# Patient Record
Sex: FEMALE | Race: WHITE | URBAN - METROPOLITAN AREA
[De-identification: names, ages, dates, MRNs, and addresses within clinical notes are randomized per-mention and may not be internally consistent; named-entity substitution may affect disease eponyms.]

---

## 2017-10-27 ENCOUNTER — TRANSFERRED RECORDS (OUTPATIENT)
Dept: HEALTH INFORMATION MANAGEMENT | Facility: CLINIC | Age: 67
End: 2017-10-27

## 2017-12-08 ENCOUNTER — TRANSFERRED RECORDS (OUTPATIENT)
Dept: HEALTH INFORMATION MANAGEMENT | Facility: CLINIC | Age: 67
End: 2017-12-08

## 2018-01-05 ENCOUNTER — TRANSFERRED RECORDS (OUTPATIENT)
Dept: HEALTH INFORMATION MANAGEMENT | Facility: CLINIC | Age: 68
End: 2018-01-05

## 2018-01-12 LAB — COPATH REPORT: NORMAL

## 2018-01-12 PROCEDURE — 00000346 ZZHCL STATISTIC REVIEW OUTSIDE SLIDES TC 88321: Performed by: OBSTETRICS & GYNECOLOGY

## 2018-01-29 ENCOUNTER — ONCOLOGY VISIT (OUTPATIENT)
Dept: ONCOLOGY | Facility: CLINIC | Age: 68
End: 2018-01-29
Attending: OBSTETRICS & GYNECOLOGY
Payer: COMMERCIAL

## 2018-01-29 VITALS
HEART RATE: 59 BPM | HEIGHT: 61 IN | OXYGEN SATURATION: 97 % | DIASTOLIC BLOOD PRESSURE: 83 MMHG | SYSTOLIC BLOOD PRESSURE: 173 MMHG | TEMPERATURE: 97.7 F | BODY MASS INDEX: 36.65 KG/M2 | WEIGHT: 194.1 LBS | RESPIRATION RATE: 18 BRPM

## 2018-01-29 DIAGNOSIS — D06.9 CARCINOMA IN SITU OF CERVIX, UNSPECIFIED LOCATION: Primary | ICD-10-CM

## 2018-01-29 PROCEDURE — G0463 HOSPITAL OUTPT CLINIC VISIT: HCPCS | Mod: ZF

## 2018-01-29 PROCEDURE — 99205 OFFICE O/P NEW HI 60 MIN: CPT | Mod: ZP | Performed by: OBSTETRICS & GYNECOLOGY

## 2018-01-29 RX ORDER — HYDROCHLOROTHIAZIDE 25 MG/1
TABLET ORAL
COMMUNITY
Start: 2003-12-24 | End: 2018-03-19

## 2018-01-29 RX ORDER — ATENOLOL 25 MG/1
TABLET ORAL
COMMUNITY
Start: 2003-12-24 | End: 2018-03-19

## 2018-01-29 RX ORDER — AMLODIPINE BESYLATE 5 MG/1
TABLET ORAL
COMMUNITY
Start: 2003-12-24 | End: 2018-03-19

## 2018-01-29 RX ORDER — BUTALBITAL, ASPIRIN, AND CAFFEINE 325; 50; 40 MG/1; MG/1; MG/1
CAPSULE ORAL
COMMUNITY
Start: 2004-10-07 | End: 2018-03-19

## 2018-01-29 RX ORDER — FUROSEMIDE 20 MG
TABLET ORAL
COMMUNITY
Start: 2002-12-16 | End: 2018-03-19

## 2018-01-29 ASSESSMENT — PAIN SCALES - GENERAL: PAINLEVEL: NO PAIN (0)

## 2018-01-29 NOTE — PROGRESS NOTES
Consult Notes on Referred Patient    Date: 2018       Dr. Raza Gamino MD  [facility]  [address]  [place] [address]       RE: Jennie Pritchett  : 1950  CAROL: 2018    Dear Dr. Raza Gamino:    I had the pleasure of seeing your patient Jennie Pritchett here at the Gynecologic Cancer Clinic at the [facility] on 2018.  As you know she is a very pleasant 67 year old woman with a recent diagnosis of RUDOLPH 2-3.  Given these findings she was subsequently sent to the Gynecologic Cancer Clinic for new patient consultation.   Patient is a G8, P7 with 5 vaginal deliveries and last 2 were C-sections, went through menopause at age 54, never been on hormone replacement therapy.  She had several abnormal Pap smears with a subsequent conization ECC back in  which was benign.  Those slides have been reviewed here.  She had then again abnormal Pap smear and underwent a cervical biopsy which came back RUDOLPH 2-3.  This was back in December.  Those slides have not been reviewed here yet.  She is otherwise eating and drinking well, no nausea, vomiting, fever or chills, no vaginal bleeding, no change in urinary or bowel symptoms.  No B symptoms.  She also has a complicated surgical history.  She has had a bowel perforation which was believed to be a small-bowel perforation in 2014 and ex lap small-bowel resection.  This was followed by 2 exploratory laparotomies for hernia repairs with mesh.             Past Medical History:  Hypertension.           Past Surgical History:  1.  Two  sections.   2.  Exploratory laparotomy with small-bowel resection after small-bowel perforation.   3.  Two exploratory laparotomies with hernia repair with mesh and peritoneal repair.           Health Maintenance:  Health Maintenance Due   Topic Date Due     TETANUS IMMUNIZATION (SYSTEM ASSIGNED)  1967     HEPATITIS C SCREENING  1967     LIPID SCREEN Q5 YR  "FEMALE (SYSTEM ASSIGNED)  02/05/1994     MAMMO SCREEN Q2 YR (SYSTEM ASSIGNED)  02/05/1999     COLON CANCER SCREEN (SYSTEM ASSIGNED)  02/05/1999     ADVANCE DIRECTIVE PLANNING Q5 YRS  02/05/2004     FALL RISK ASSESSMENT  02/05/2014     DEXA SCAN SCREENING (SYSTEM ASSIGNED)  02/05/2014     PNEUMOCOCCAL (1 of 2 - PCV13) 02/05/2014     INFLUENZA VACCINE (SYSTEM ASSIGNED)  09/01/2017     Abnormal Pap smear with dysplasia.   Next colonoscopy in 2 years last one was 3 years ago.             Current Medications:     has a current medication list which includes the following prescription(s): hydrochlorothiazide, furosemide, ranitidine, amlodipine, atenolol, and butalbital-aspirin-caffeine.       Allergies:     [unfilled]        Social History:     Social History   Substance Use Topics     Smoking status: Never Smoker     Smokeless tobacco: Never Used     Alcohol use No       History   Drug Use No           Family History:   Brother had lung cancer in his 50s. Mother had breast cancer in her 70s.           Physical Exam:     /83  Pulse 59  Temp 97.7  F (36.5  C) (Oral)  Resp 18  Ht 1.537 m (5' 0.5\")  Wt 88 kg (194 lb 1.6 oz)  SpO2 97%  Breastfeeding? No  BMI 37.28 kg/m2  Body mass index is 37.28 kg/(m^2).    General Appearance: healthy and alert, no distress     Musculoskeletal: extremities non tender and without edema    Skin: no lesions or rashes     Neurological: normal gait, no gross defects     Psychiatric: appropriate mood and affect                               ABDOMEN:  Soft, nontender.  There is mesh palpable and well-healed midline incision.     PELVIC:  Normal external genitalia, normal-appearing vaginal mucosa, cervix flush vagina with scar tissue.  No adnexal masses or tenderness.  Rectovaginal confirms.             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     A total of 60 minutes was spent with the patient, 40 minutes of which were spent in counseling " the patient and/or treatment planning.    1.  Long-standing history of cervical dysplasia.   2.  Recent RUDOLPH 2-3.   3.  Hypertension.     4.  Complicated surgical history.    5.  Class 2 obesity.      I discussed with the patient we will await her biopsy results here.  If those are confirmed high-grade dysplasia, we will have her come back for treatment planning.  Most likely we are not going to be able to do another conization given how flush and scarred the cervix is, which would leave us with laser ablation versus hysterectomy.  Given her complicated surgical history, would like to try to avoid abdominal surgery if possible, as she might have some abdominal wall reconstruction given the mesh.  The patient agrees with the plan, is very appreciative of her care.  We will see her back after biopsy results have been obtained.             Thank you for allowing us to participate in the care of your patient.         Sincerely,    Bird Isbell MD, MS    Department of Obstetrics and Gynecology   Division of Gynecologic Oncology   [facility]  Phone: [phone]    CC  Patient Care Team:  Gin Kaye as PCP - General (Family Practice)  Raza Gamino MD as Referring Physician (OB/Gyn)  RAZA GAMINO

## 2018-01-29 NOTE — MR AVS SNAPSHOT
After Visit Summary   1/29/2018    Jennie Pritchett    MRN: [id number]           Patient Information     Date Of Birth          1950        Visit Information        Provider Department      1/29/2018 12:00 PM Bird Isbell MD [facility]         Follow-ups after your visit        Your next 10 appointments already scheduled     Mar 19, 2018 11:20 AM CDT   (Arrive by 11:05 AM)   Return Visit with Bird Isbell MD   [facility] ([facility])    [address] [place] [address]  [address]  [facility] [address]   [phone]            Mar 19, 2018  1:30 PM CDT   (Arrive by 1:15 PM)   PAC EVALUATION with  Pac Desiree 9   [facility] Preoperative Assessment [place] ([facility])    [address] [place] [address]  4th Floor  [facility] [address] [phone]            Mar 19, 2018  2:30 PM CDT   (Arrive by 2:15 PM)   PAC RN ASSESSMENT with  Pac Rn   [facility] Preoperative [facility] [place] ([facility])    [address] [place] [address]  4th Floor  [facility] [address] [phone]            Mar 19, 2018  3:00 PM CDT   (Arrive by 2:45 PM)   PAC Anesthesia Consult with  Pac Anesthesiologist   [facility] Preoperative Assessment [place] ([facility])    [address] [place] [address]  4th Floor  [facility] [address] [phone]            Mar 19, 2018  3:15 PM CDT   [facility] Draw with  [facility] DRAW   [facility] ([facility])    [address] [place] [address]  [address]  [facility] [id number] [phone]              Who to contact     If you have questions or need follow up information about today's clinic visit or your schedule please contact [facility] CANCER [facility] directly at [phone].  Normal or non-critical lab and imaging results will be communicated to you by MyChart, letter or phone within 4 business days after the  "clinic has received the results. If you do not hear from us within 7 days, please contact the clinic through [employer] or phone. If you have a critical or abnormal lab result, we will notify you by phone as soon as possible.  Submit refill requests through [employer] or call your pharmacy and they will forward the refill request to us. Please allow 3 business days for your refill to be completed.          Additional Information About Your Visit        [employer] Information     [employer] lets you send messages to your doctor, view your test results, renew your prescriptions, schedule appointments and more. To sign up, go to [email or web address] . Click on \"Log in\" on the left side of the screen, which will take you to the Welcome page. Then click on \"Sign up Now\" on the right side of the page.     You will be asked to enter the access code listed below, as well as some personal information. Please follow the directions to create your username and password.     Your access code is: [email or web address]  Expires: 2018  9:39 AM     Your access code will  in 90 days. If you need help or a new code, please call your [place] clinic or [phone].        Care EveryWhere ID     This is your Care EveryWhere ID. This could be used by other organizations to access your [place] medical records  [id number]        Your Vitals Were     Pulse Temperature Respirations Height Pulse Oximetry Breastfeeding?    59 97.7  F (36.5  C) (Oral) 18 1.537 m (5' 0.5\") 97% No    BMI (Body Mass Index)                   37.28 kg/m2            Blood Pressure from Last 3 Encounters:   18 173/83    Weight from Last 3 Encounters:   18 88 kg (194 lb 1.6 oz)              Today, you had the following     No orders found for display       Primary Care Provider Office Phone # Fax #    Gin Vargas [phone]       [facility] [address]  [facility] [address]        Equal Access to Services     LISET CATALAN AH: Hadii aad " bryant Red, wasamuelda lufuadadaha, qatoñota kamohsen hunter, david derekin hayaatata turnerarnoldo ty lamarlenytata stormy. So [facility] [phone].    ATENCIÓN: Si habla español, tiene a wagoner disposición servicios gratuitos de asistencia lingüística. Ashley al [phone].    We comply with applicable federal civil rights laws and [place] laws. We do not discriminate on the basis of race, color, national origin, age, disability, sex, sexual orientation, or gender identity.            Thank you!     Thank you for choosing [facility] CANCER [facility]  for your care. Our goal is always to provide you with excellent care. Hearing back from our patients is one way we can continue to improve our services. Please take a few minutes to complete the written survey that you may receive in the mail after your visit with us. Thank you!             Your Updated Medication List - Protect others around you: Learn how to safely use, store and throw away your medicines at www.disposemymeds.org.          This list is accurate as of 1/29/18 11:59 PM.  Always use your most recent med list.                   Brand Name Dispense Instructions for use Diagnosis    atenolol 25 MG tablet    TENORMIN     ONE TABLET DAILY        FIORINAL capsule   Generic drug:  butalbital-aspirin-caffeine      1-2 tabs every 6 hrs as needed for HAs        hydrochlorothiazide 25 MG tablet    HYDRODIURIL     ONE TABLET DAILY        LASIX 20 MG tablet   Generic drug:  furosemide      As needed        NORVASC 5 MG tablet   Generic drug:  amLODIPine      ONE DAILY        ranitidine 150 MG capsule    ZANTAC     one capsule two times a day as needed

## 2018-01-29 NOTE — LETTER
2018       RE: Jennie Pritchett  [address]  [facility] [address]     Dear Colleague,    Thank you for referring your patient, Jennie Pritchett, to the [facility] CANCER CLINIC. Please see a copy of my visit note below.                            Consult Notes on Referred Patient    Date: 2018       Dr. Raza Gamino MD  [facility]  [address]  [place] [address]       RE: Jennie Pritchett  : 1950  CAROL: 2018    Dear Dr. Raza Gamino:    I had the pleasure of seeing your patient Jennie Pritchett here at the Gynecologic Cancer Clinic at the [facility] on 2018.  As you know she is a very pleasant 67 year old woman with a recent diagnosis of RUDOLPH 2-3.  Given these findings she was subsequently sent to the Gynecologic Cancer Clinic for new patient consultation.   Patient is a G8, P7 with 5 vaginal deliveries and last 2 were C-sections, went through menopause at age 54, never been on hormone replacement therapy.  She had several abnormal Pap smears with a subsequent conization ECC back in  which was benign.  Those slides have been reviewed here.  She had then again abnormal Pap smear and underwent a cervical biopsy which came back RUDOLPH 2-3.  This was back in December.  Those slides have not been reviewed here yet.  She is otherwise eating and drinking well, no nausea, vomiting, fever or chills, no vaginal bleeding, no change in urinary or bowel symptoms.  No B symptoms.  She also has a complicated surgical history.  She has had a bowel perforation which was believed to be a small-bowel perforation in 2014 and ex lap small-bowel resection.  This was followed by 2 exploratory laparotomies for hernia repairs with mesh.             Past Medical History:  Hypertension.           Past Surgical History:  1.  Two  sections.   2.  Exploratory laparotomy with small-bowel resection after small-bowel perforation.   3.  Two exploratory laparotomies with  "hernia repair with mesh and peritoneal repair.           Health Maintenance:  Health Maintenance Due   Topic Date Due     TETANUS IMMUNIZATION (SYSTEM ASSIGNED)  02/05/1967     HEPATITIS C SCREENING  02/05/1967     LIPID SCREEN Q5 YR FEMALE (SYSTEM ASSIGNED)  02/05/1994     MAMMO SCREEN Q2 YR (SYSTEM ASSIGNED)  02/05/1999     COLON CANCER SCREEN (SYSTEM ASSIGNED)  02/05/1999     ADVANCE DIRECTIVE PLANNING Q5 YRS  02/05/2004     FALL RISK ASSESSMENT  02/05/2014     DEXA SCAN SCREENING (SYSTEM ASSIGNED)  02/05/2014     PNEUMOCOCCAL (1 of 2 - PCV13) 02/05/2014     INFLUENZA VACCINE (SYSTEM ASSIGNED)  09/01/2017     Abnormal Pap smear with dysplasia.   Next colonoscopy in 2 years last one was 3 years ago.             Current Medications:     has a current medication list which includes the following prescription(s): hydrochlorothiazide, furosemide, ranitidine, amlodipine, atenolol, and butalbital-aspirin-caffeine.       Allergies:     [unfilled]        Social History:     Social History   Substance Use Topics     Smoking status: Never Smoker     Smokeless tobacco: Never Used     Alcohol use No       History   Drug Use No           Family History:   Brother had lung cancer in his 50s. Mother had breast cancer in her 70s.           Physical Exam:     /83  Pulse 59  Temp 97.7  F (36.5  C) (Oral)  Resp 18  Ht 1.537 m (5' 0.5\")  Wt 88 kg (194 lb 1.6 oz)  SpO2 97%  Breastfeeding? No  BMI 37.28 kg/m2  Body mass index is 37.28 kg/(m^2).    General Appearance: healthy and alert, no distress     Musculoskeletal: extremities non tender and without edema    Skin: no lesions or rashes     Neurological: normal gait, no gross defects     Psychiatric: appropriate mood and affect                               ABDOMEN:  Soft, nontender.  There is mesh palpable and well-healed midline incision.     PELVIC:  Normal external genitalia, normal-appearing vaginal mucosa, cervix flush vagina with scar tissue.  No adnexal masses " or tenderness.  Rectovaginal confirms.             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     A total of 60 minutes was spent with the patient, 40 minutes of which were spent in counseling the patient and/or treatment planning.    1.  Long-standing history of cervical dysplasia.   2.  Recent RUDOLPH 2-3.   3.  Hypertension.     4.  Complicated surgical history.    5.  Class 2 obesity.      I discussed with the patient we will await her biopsy results here.  If those are confirmed high-grade dysplasia, we will have her come back for treatment planning.  Most likely we are not going to be able to do another conization given how flush and scarred the cervix is, which would leave us with laser ablation versus hysterectomy.  Given her complicated surgical history, would like to try to avoid abdominal surgery if possible, as she might have some abdominal wall reconstruction given the mesh.  The patient agrees with the plan, is very appreciative of her care.  We will see her back after biopsy results have been obtained.             Thank you for allowing us to participate in the care of your patient.         Sincerely,    Bird Isbell MD, MS    Department of Obstetrics and Gynecology   Division of Gynecologic Oncology   [facility]  Phone: [phone]    CC  Patient Care Team:  Gin Kaye as PCP - General (Family Practice)  Raza Gamino MD as Referring Physician (OB/Gyn)  \

## 2018-01-29 NOTE — NURSING NOTE
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 No  BMI 37.28 kg/m2 Estimated body mass index is 37.28 kg/(m^2) as calculated from the following:    Height as of this encounter: 1.537 m (5' 0.5\").    Weight as of this encounter: 88 kg (194 lb 1.6 oz). Body surface area is 1.94 meters squared.  No Pain (0) Comment: Data Unavailable   No LMP recorded. Patient is postmenopausal.  Allergies reviewed: Yes  Medications reviewed: Yes    Medications: Medication refills not needed today.  Pharmacy name entered into EPIC: [facility] [place], [place] - [address] [place] [address]    Clinical concerns: no concerns - dr. oconnor was notified      8 minutes for nursing intake (face to face time)     Pedro Luis Montoya CMA              "

## 2018-01-31 PROCEDURE — 00000346 ZZHCL STATISTIC REVIEW OUTSIDE SLIDES TC 88321: Performed by: OBSTETRICS & GYNECOLOGY

## 2018-02-01 LAB — COPATH REPORT: NORMAL

## 2018-02-08 ENCOUNTER — TELEPHONE (OUTPATIENT)
Dept: ONCOLOGY | Facility: CLINIC | Age: 68
End: 2018-02-08

## 2018-02-08 DIAGNOSIS — D06.9 CIN III (CERVICAL INTRAEPITHELIAL NEOPLASIA GRADE III) WITH SEVERE DYSPLASIA: Primary | ICD-10-CM

## 2018-02-08 NOTE — PROGRESS NOTES
Result reviewed by MD. Patient will return to see Dr. Isbell for surgery discussion. PAC will be scheduled after. Patient called with updated plan.

## 2018-02-08 NOTE — TELEPHONE ENCOUNTER
"RN called patient with result and plan. Patient would prefer to have a hysterectomy versus minimal management.     RN was unable to get return with Sukhi and PAC prior to March 19th. Patient wanted all appointments together. She stated her kids have to take off work to bring her to appointments. She prefer pushing her appointment out to \"clump\" appointments.     RN made appointments.     Margi Parkinson RN        "

## 2018-03-19 ENCOUNTER — ONCOLOGY VISIT (OUTPATIENT)
Dept: ONCOLOGY | Facility: CLINIC | Age: 68
End: 2018-03-19
Attending: OBSTETRICS & GYNECOLOGY
Payer: COMMERCIAL

## 2018-03-19 ENCOUNTER — APPOINTMENT (OUTPATIENT)
Dept: SURGERY | Facility: CLINIC | Age: 68
End: 2018-03-19
Payer: COMMERCIAL

## 2018-03-19 ENCOUNTER — ANESTHESIA EVENT (OUTPATIENT)
Dept: SURGERY | Facility: CLINIC | Age: 68
End: 2018-03-19

## 2018-03-19 ENCOUNTER — ALLIED HEALTH/NURSE VISIT (OUTPATIENT)
Dept: SURGERY | Facility: CLINIC | Age: 68
End: 2018-03-19
Payer: COMMERCIAL

## 2018-03-19 ENCOUNTER — OFFICE VISIT (OUTPATIENT)
Dept: SURGERY | Facility: CLINIC | Age: 68
End: 2018-03-19
Payer: COMMERCIAL

## 2018-03-19 VITALS
RESPIRATION RATE: 18 BRPM | SYSTOLIC BLOOD PRESSURE: 157 MMHG | TEMPERATURE: 98.2 F | HEART RATE: 69 BPM | OXYGEN SATURATION: 98 % | WEIGHT: 192.68 LBS | DIASTOLIC BLOOD PRESSURE: 80 MMHG | BODY MASS INDEX: 37.01 KG/M2

## 2018-03-19 VITALS
SYSTOLIC BLOOD PRESSURE: 157 MMHG | RESPIRATION RATE: 18 BRPM | DIASTOLIC BLOOD PRESSURE: 80 MMHG | WEIGHT: 192.6 LBS | HEART RATE: 69 BPM | HEIGHT: 61 IN | OXYGEN SATURATION: 98 % | TEMPERATURE: 98.2 F | BODY MASS INDEX: 36.36 KG/M2

## 2018-03-19 DIAGNOSIS — Z01.818 PREOP EXAMINATION: Primary | ICD-10-CM

## 2018-03-19 DIAGNOSIS — C53.9: ICD-10-CM

## 2018-03-19 DIAGNOSIS — N87.9 CERVICAL DYSPLASIA: Primary | ICD-10-CM

## 2018-03-19 LAB
ANION GAP SERPL CALCULATED.3IONS-SCNC: 12 MMOL/L (ref 3–14)
BUN SERPL-MCNC: 10 MG/DL (ref 7–30)
CALCIUM SERPL-MCNC: 9.7 MG/DL (ref 8.5–10.1)
CHLORIDE SERPL-SCNC: 98 MMOL/L (ref 94–109)
CO2 SERPL-SCNC: 22 MMOL/L (ref 20–32)
CREAT SERPL-MCNC: 0.93 MG/DL (ref 0.52–1.04)
ERYTHROCYTE [DISTWIDTH] IN BLOOD BY AUTOMATED COUNT: 13.1 % (ref 10–15)
GFR SERPL CREATININE-BSD FRML MDRD: 60 ML/MIN/1.7M2
GLUCOSE SERPL-MCNC: 97 MG/DL (ref 70–99)
HCT VFR BLD AUTO: 36 % (ref 35–47)
HGB BLD-MCNC: 12.2 G/DL (ref 11.7–15.7)
MCH RBC QN AUTO: 30.4 PG (ref 26.5–33)
MCHC RBC AUTO-ENTMCNC: 33.9 G/DL (ref 31.5–36.5)
MCV RBC AUTO: 90 FL (ref 78–100)
PLATELET # BLD AUTO: 303 10E9/L (ref 150–450)
POTASSIUM SERPL-SCNC: 4.1 MMOL/L (ref 3.4–5.3)
RBC # BLD AUTO: 4.01 10E12/L (ref 3.8–5.2)
SODIUM SERPL-SCNC: 131 MMOL/L (ref 133–144)
WBC # BLD AUTO: 6.4 10E9/L (ref 4–11)

## 2018-03-19 PROCEDURE — 86901 BLOOD TYPING SEROLOGIC RH(D): CPT | Performed by: PHYSICIAN ASSISTANT

## 2018-03-19 PROCEDURE — 86900 BLOOD TYPING SEROLOGIC ABO: CPT | Performed by: PHYSICIAN ASSISTANT

## 2018-03-19 PROCEDURE — 86850 RBC ANTIBODY SCREEN: CPT | Performed by: PHYSICIAN ASSISTANT

## 2018-03-19 PROCEDURE — 85027 COMPLETE CBC AUTOMATED: CPT | Performed by: PHYSICIAN ASSISTANT

## 2018-03-19 PROCEDURE — 36415 COLL VENOUS BLD VENIPUNCTURE: CPT

## 2018-03-19 PROCEDURE — 99214 OFFICE O/P EST MOD 30 MIN: CPT | Mod: 57 | Performed by: OBSTETRICS & GYNECOLOGY

## 2018-03-19 PROCEDURE — G0463 HOSPITAL OUTPT CLINIC VISIT: HCPCS | Mod: ZF

## 2018-03-19 PROCEDURE — 80048 BASIC METABOLIC PNL TOTAL CA: CPT | Performed by: PHYSICIAN ASSISTANT

## 2018-03-19 RX ORDER — MULTIPLE VITAMINS W/ MINERALS TAB 9MG-400MCG
1 TAB ORAL AT BEDTIME
COMMUNITY

## 2018-03-19 ASSESSMENT — PAIN SCALES - GENERAL: PAINLEVEL: NO PAIN (0)

## 2018-03-19 NOTE — NURSING NOTE
Chief Complaint   Patient presents with     Blood Draw     Labs drawn from left hand in lab by CMA      Pt tolerated well

## 2018-03-19 NOTE — PATIENT INSTRUCTIONS
Preparing for Your Surgery      Name:  Jennie Pritchett   MRN:  [id number]   :  1950   Today's Date:  3/19/2018     Arriving for surgery:  Jennie, We will call you with your surgery date, time, location and fasting instructions  Surgery date:    Arrival time:    Please come to:         What can I eat or drink?  -  You may have solid food or milk products until 8 hours prior to your surgery.  -  You may have water, apple juice or 7up/Sprite until 2 hours prior to your surgery.    Which medicines can I take? (Please hold aspirin products 7 days prior to procedure)  -  Do NOT take these medications in the morning, the day of surgery:      -  Please take these medications the day of surgery:         How do I prepare myself?  -  Take two showers: one the night before surgery; and one the morning of surgery.         Use Scrubcare or Hibiclens to wash from neck down.  You may use your own shampoo and conditioner. No other hair products.   -  Do NOT use lotion, powder, deodorant, or antiperspirant the day of your surgery.  -  Do NOT wear any makeup, fingernail polish or jewelry.  -Do not bring your own medications to the hospital, except for inhalers and eye drops.  -  Bring your ID and insurance card.    Questions or Concerns:  If you have questions or concerns regarding the day of surgery, please call the Preoperative Assessment Center (PAC), Monday-Friday 7AM-7PM:  [phone].  After surgery please call your surgeons office.           AFTER YOUR SURGERY  Breathing exercises   Breathing exercises help you recover faster. Take deep breaths and let the air out slowly. This will:     Help you wake up after surgery.    Help prevent complications like pneumonia.  Preventing complications will help you go home sooner.   We may give you a breathing device (incentive spirometer) to encourage you to breathe deeply.   Nausea and vomiting   You may feel sick to your stomach after surgery; if so, let your nurse know.    Pain  control:  After surgery, you may have pain. Our goal is to help you manage your pain. Pain medicine will help you feel comfortable enough to do activities that will help you heal.  These activities may include breathing exercises, walking and physical therapy.   To help your health care team treat your pain we will ask: 1) If you have pain  2) where it is located 3) describe your pain in your words  Methods of pain control include medications given by mouth, vein or by nerve block for some surgeries.  We may give you a pain control pump that will:  1) Deliver the medicine through a tube placed in your vein  2) Control the amount of medicine you receive  3) Allow you to push a button to deliver a dose of pain medicine  Sequential Compression Device (SCD) or Pneumo Boots:  You may need to wear SCD S on your legs or feet. These are wraps connected to a machine that pumps in air and releases it. The repeated pumping helps prevent blood clots from forming.

## 2018-03-19 NOTE — LETTER
3/19/2018       RE: Jennie Pritchett  [address]  [facility] [address]     Dear Colleague,    Thank you for referring your patient, Jennie Pritchett, to the [facility]. Please see a copy of my visit note below.                Follow Up Notes on Referred Patient    Date: 3/19/2018       Dr. Gin Kaye  [facility]  [address] [place] [address]       RE: Jennie Pritchett  : 1950  CAROL: 3/19/2018    Dear Dr. Gin Kaye:    Jennie Pritchett is a 68 year old woman with a diagnosis of high grade cervical dysplasia.             The patient presents today for followup.  She has been doing well since the last time I have seen her.  No new symptoms.         Past Medical History:    Past Medical History:   Diagnosis Date     GERD (gastroesophageal reflux disease)      History of small bowel obstruction     s/p resection     Hyperlipidemia      Hypertension      Obesity          Past Surgical History:    Past Surgical History:   Procedure Laterality Date     BUNIONECTOMY       CATARACT IOL, RT/LT        SECTION       COLONOSCOPY       CONIZATION  2015     HERNIA REPAIR  2014    ventral hernia     SMALL BOWEL RESECTION           Health Maintenance Due   Topic Date Due     TETANUS IMMUNIZATION (SYSTEM ASSIGNED)  1967     HEPATITIS C SCREENING  1967     LIPID SCREEN Q5 YR FEMALE (SYSTEM ASSIGNED)  1994     MAMMO SCREEN Q2 YR (SYSTEM ASSIGNED)  1999     COLON CANCER SCREEN (SYSTEM ASSIGNED)  1999     ADVANCE DIRECTIVE PLANNING Q5 YRS  2004     FALL RISK ASSESSMENT  2014     DEXA SCAN SCREENING (SYSTEM ASSIGNED)  2014     PNEUMOCOCCAL (1 of 2 - PCV13) 2014       Current Medications:     Current Outpatient Prescriptions   Medication Sig Dispense Refill     LOSARTAN POTASSIUM PO Take by mouth every morning       HYDRALAZINE HCL PO Take by mouth 2 times daily       Calcium Citrate-Vitamin D (CALCIUM + D PO) Take by  mouth 2 times daily       MAGNESIUM PO Take by mouth 2 times daily       Solifenacin Succinate (VESICARE PO) Take by mouth At Bedtime       CARVEDILOL PO Take by mouth 2 times daily (with meals)       multivitamin, therapeutic with minerals (MULTI-VITAMIN) TABS tablet Take 1 tablet by mouth At Bedtime       Docusate Sodium (COLACE PO) Take by mouth At Bedtime       ranitidine (ZANTAC) 150 MG capsule one capsule two times a day as needed           Allergies:        Allergies   Allergen Reactions     Lisinopril Cough     Metoprolol Other (See Comments)     Causes lowered heart rate         Social History:     Social History   Substance Use Topics     Smoking status: Former Smoker     Packs/day: 0.25     Years: 40.00     Quit date: 3/19/2000     Smokeless tobacco: Never Used     Alcohol use Yes      Comment: monthy       History   Drug Use No           Physical Exam:     /80  Pulse 69  Temp 98.2  F (36.8  C) (Oral)  Resp 18  Wt 87.4 kg (192 lb 10.9 oz)  SpO2 98%  BMI 37.01 kg/m2  Body mass index is 37.01 kg/(m^2).    General Appearance: healthy and alert, no distress     ABDOMEN:  Soft, nontender, nondistended.  Well-healed midline incision.   PELVIC:  Normal external genitalia.  The cervix has been removed with a cone biopsy.  No abnormalities on bimanual exam.  Confirmed rectovaginal.         Assessment:    Jennie Pritchett is a 68 year old woman with a diagnosis of high grade cervical dysplasia.     A total of 30 minutes was spent with the patient, 25 minutes of which were spent in counseling the patient and/or treatment planning.      1.  High-grade cervical dysplasia.   2.  Prior bowel perforation with bowel resection.   3.  Abdominal wall hernia repair.       Discussed with the patient, recommended to proceed with a robotic hysterectomy as well as salpingo-oophorectomy, possible pelvic and paraaortic lymphadenectomy.  There is a significant chance we will have to do this open, as well as possible bowel  resection and even possible stoma.  We will obtain an MRI to be sure there are no structural abnormalities in the cervix, as we cannot do another conization given the absence of the cervix.  The patient agrees with the plan.  I will have her see my colleagues in Anesthesia for preoperative clearance.  All questions were answered.       Risks, benefits and alternatives to proceed discussed in detail with the patient. Risks include but are not limited to bleeding, infection, possible injury to surrounding organs including bowel, bladder, ureter, need for second procedure/surgery related to complications from first procedure, postoperative medical complications such as cardiopulmonary events, lymphedema, lymphocyst, thromboembolic events.  Consent for surgery, blood transfusion signed.  Will arrange appropriate preoperative blood work, CXR, EKG. Patient also advised on need for postoperative surveillance and/or adjuvant therapy. Questions answered.    Bird Isbell MD, MS    Department of Obstetrics and Gynecology   Division of Gynecologic Oncology   [facility]  Phone: [phone]      CC  Patient Care Team:  Gin Kaye as PCP - General (Family Practice)

## 2018-03-19 NOTE — NURSING NOTE
Pre Op Nurse Teaching Template    Relevant Diagnosis: Cervical Dysplasia     Teaching Topic: Robotic assisted hysterectomy, BSO, possible lymph node dissection, possible open, possible cancer staging, possible bowel resection, cystoscopy    Person(s) involved in teaching :  Patient  Alone   Motivation Level:  Asks Questions:    Yes      Eager to Learn:     Yes     Cooperative:          Yes    Receptive (willing. Able to accept information):    Yes      Patient and those who are listed above demonstrates understanding of the following:   Reason for the appointment, diagnosis and treatment plan:   Yes   Knowledge of proper use of medications and conditions for which they are ordered (with special attention to potential side effects or drug interactions): Yes   Which situations necessitate calling provider and whom to contact: Yes         Nutritional needs and diet plan:  Yes      Pain management techniques:     Yes, Pain Scale   Diet:   Yes, [facility] Diet Instructions    Teaching Concerns addressed: Yes    Infection Prevention:  Patient and those who are listed above demonstrate understanding of the following:  Pre-Op CHG Bathing Instructions: Yes  Surgical procedure site care taught:   Yes   Signs and symptoms of infection taught: Yes       Instructional Materials Used/Given:  The [place] Before You Surgery Booklet  Showering or Bathing before Surgery Instructions   Hysterectomy Guidelines  Pain Assessment Tool   Home Care after Major Abdominal or Vaginal Surgery  Map  Accommodations Brochure  Phone numbers for [facility] and [facility]  Copy of Surgical Consent    Comments:  CHANDNI Parkinson RN

## 2018-03-19 NOTE — H&P
Pre-Operative H & P     CC:  Preoperative exam to assess for increased cardiopulmonary risk while undergoing surgery and anesthesia.    Date of Encounter: 2018   Primary Care Physician:  Gin Kaye   Reason for Visit/Surgery:  Carcinoma of cervix (H) [C53.9]      HPI  Jennie Pritchett is a 68 year old female who presents for pre-operative H & P in preparation for a Total Hysterectomy on Date TBD with Dr. Isbell for cervical carcinoma at the [facility].     Ms. Pritchett has a long standing history of cervical dysplasia s/p cervical conization in  and colposcopy 2018.  This has progressed to carcinoma of the cervix, so the above surgery is recommended as the next step in treatment.   She reports feeling well overall.  She does have  long standing inner ear issues causing balance problems, but has not fallen.  She reports having a normal exercise stress test in  from her home clinic of [facility] in [place].  We have requested these records.  She has a 10 pack year smoking history and quit in .  She has no known pulmonary disease.  She goes up and down several steps daily and gets on the floor to clean weekly.      History is obtained from the  medical record including Care Everywhere.        Past Medical History  Past Medical History:   Diagnosis Date     GERD (gastroesophageal reflux disease)      History of small bowel obstruction     s/p resection     Hyperlipidemia      Hypertension      Obesity         Past Surgical History  Past Surgical History:   Procedure Laterality Date     BUNIONECTOMY  2004     CATARACT IOL, RT/LT        SECTION       COLONOSCOPY       CONIZATION  2015     HERNIA REPAIR      ventral hernia     SMALL BOWEL RESECTION         Hx of Blood transfusions/reactions: No reactions     Personal or FH with difficulty with Anesthesia:  None    Prior to Admission Medications  Current Outpatient Prescriptions    Medication Sig Dispense Refill     LOSARTAN POTASSIUM PO Take by mouth every morning       HYDRALAZINE HCL PO Take by mouth 2 times daily       Calcium Citrate-Vitamin D (CALCIUM + D PO) Take by mouth 2 times daily       MAGNESIUM PO Take by mouth 2 times daily       Solifenacin Succinate (VESICARE PO) Take by mouth At Bedtime       CARVEDILOL PO Take by mouth 2 times daily (with meals)       multivitamin, therapeutic with minerals (MULTI-VITAMIN) TABS tablet Take 1 tablet by mouth At Bedtime       Docusate Sodium (COLACE PO) Take by mouth At Bedtime       ranitidine (ZANTAC) 150 MG capsule one capsule two times a day as needed           Allergies  Lisinopril and Metoprolol     Social History  Social History     Social History     Marital status:      Spouse name: N/A     Number of children: N/A     Years of education: N/A     Occupational History     Not on file.     Social History Main Topics     Smoking status: Former Smoker     Packs/day: 0.25     Years: 40.00     Quit date: 3/19/2000     Smokeless tobacco: Never Used     Alcohol use Yes      Comment: monthy     Drug use: No     Sexual activity: Not on file     Other Topics Concern     Not on file     Social History Narrative          Family History  No family history of bleeding, clotting disorders or complications with anesthesia.      ROS   The complete review of systems is negative other than noted in the HPI or here.   Constitutional: Denies  fevers/chills.    EENT: Denies difficulty opening mouth or swallowing.  Cardiovascular: Denies pain, tightness or squeezing in chest, upper abdomen, shoulder, or neck.  Denies HOLLINS or orthopnea, palpitations or syncope.  Respiratory: Denies significant shortness of breath or cough.    GI:  Heartburn; denies nausea/vomiting     : Denies dysuria   Musculoskeletal: Arthritis  Skin: Denies rashes, infection or wounds.    Hematologic: Denies prolonged bleeding, anemia or blood clot history  Neurologic: Denies  "history of stroke, TIA, migraines, seizures, dizziness, numbness/tingling  Psychiatric: Denies changes in mood or affect.      Cardiology Tests: (personally reviewed):   Review Results Below in A/P    Labs: (personally reviewed):  Lab Results   Component Value Date    WBC 6.4 03/19/2018    HGB 12.2 03/19/2018    HCT 36.0 03/19/2018     03/19/2018     (L) 03/19/2018    POTASSIUM 4.1 03/19/2018    HENRY 9.7 03/19/2018    GLC 97 03/19/2018    CR 0.93 03/19/2018    BUN 10 03/19/2018    CO2 22 03/19/2018          Physical Exam:  No LMP recorded. Patient is postmenopausal.   Vital signs:  /80  Pulse 69  Temp 98.2  F (36.8  C) (Oral)  Resp 18  Ht 1.537 m (5' 0.5\")  Wt 87.4 kg (192 lb 9.6 oz)  SpO2 98%  BMI 37 kg/m2    Constitutional: Awake, alert, cooperative, no apparent distress, and appears stated age.  Eyes: Pupils equal, round and reactive to light, sclera clear, conjunctiva normal.  HENT: Normocephalic, oral pharynx with moist mucus membranes. No goiter appreciated.   Respiratory: Clear to auscultation bilaterally, no crackles or wheezing.  Cardiovascular: Regular rate and rhythm and no overt murmur noted.  No carotid bruits auscultated. No edema. Palpable pulses to radial  DP and PT arteries.   GI: Normal bowel sounds, soft, non-distended, non-tender, no masses palpated  Skin: Warm and dry.  No rashes at anticipated surgical site.   Musculoskeletal: Full extension of the neck.  No redness, warmth, or swelling of the joints noted. Gross motor strength is normal.    Neurologic: Awake, alert, oriented to name, place and time.  Gait is normal.   Neuropsychiatric: Calm, cooperative. Normal affect.     Assessment/Plan  Jennie Pritchett is a 68 year old female who presents for pre-operative H & P in preparation for a Total Hysterectomy on Date TBD with Dr. Isbell for cervical carcinoma at the [facility].    PAC referral for risk assessment and optimization " for anesthesia with comorbid conditions of:    Pre-operative considerations:  1.  Cardiac:  Functional status METS >4   Risk of Major Adverse Cardiac event: 0.9%  -HTN, no known cardiac disease, patient reports having a normal exercise stress test in 2017 from her home clinic of [facility] in [place],  records requested  2.  Pulm:   CARLTON risk:  low  -Former smoker, 10 pack years, quit 2000  3.  GI:  Risk of PONV score =3 .  If > 2, anti-emetic intervention recommended.  -History of small bowel obstruction s/p small bowel resection in 2014    Patient is optimized and is an acceptable candidate for the proposed procedure.  No further diagnostic evaluation is needed.      AVS given to patient regarding medication instructions,  surgery time/arrival time and NPO status.  Gill Flores MS PA-C   Preoperative Assessment Center  [facility]  [facility] and [facility]  Phone: [phone]  Fax: [phone]

## 2018-03-19 NOTE — MR AVS SNAPSHOT
After Visit Summary   3/19/2018    Jennie Pritchett    MRN: [id number]           Patient Information     Date Of Birth          1950        Visit Information        Provider Department      3/19/2018 2:30 PM Rn, [facility]        Care Instructions    Preparing for Your Surgery      Name:  Jennie Pritchett   MRN:  [id number]   :  1950   Today's Date:  3/19/2018     Arriving for surgery:  Jennie, We will call you with your surgery date, time, location and fasting instructions  Surgery date:    Arrival time:    Please come to:         What can I eat or drink?  -  You may have solid food or milk products until 8 hours prior to your surgery.  -  You may have water, apple juice or 7up/Sprite until 2 hours prior to your surgery.    Which medicines can I take? (Please hold aspirin products 7 days prior to procedure)  -  Do NOT take these medications in the morning, the day of surgery:      -  Please take these medications the day of surgery:         How do I prepare myself?  -  Take two showers: one the night before surgery; and one the morning of surgery.         Use Scrubcare or Hibiclens to wash from neck down.  You may use your own shampoo and conditioner. No other hair products.   -  Do NOT use lotion, powder, deodorant, or antiperspirant the day of your surgery.  -  Do NOT wear any makeup, fingernail polish or jewelry.  -Do not bring your own medications to the hospital, except for inhalers and eye drops.  -  Bring your ID and insurance card.    Questions or Concerns:  If you have questions or concerns regarding the day of surgery, please call the Preoperative Assessment Center (PAC), Monday-Friday 7AM-7PM:  [phone].  After surgery please call your surgeons office.           AFTER YOUR SURGERY  Breathing exercises   Breathing exercises help you recover faster. Take deep breaths and let the air out slowly. This will:     Help you wake up after surgery.     Help prevent complications like pneumonia.  Preventing complications will help you go home sooner.   We may give you a breathing device (incentive spirometer) to encourage you to breathe deeply.   Nausea and vomiting   You may feel sick to your stomach after surgery; if so, let your nurse know.    Pain control:  After surgery, you may have pain. Our goal is to help you manage your pain. Pain medicine will help you feel comfortable enough to do activities that will help you heal.  These activities may include breathing exercises, walking and physical therapy.   To help your health care team treat your pain we will ask: 1) If you have pain  2) where it is located 3) describe your pain in your words  Methods of pain control include medications given by mouth, vein or by nerve block for some surgeries.  We may give you a pain control pump that will:  1) Deliver the medicine through a tube placed in your vein  2) Control the amount of medicine you receive  3) Allow you to push a button to deliver a dose of pain medicine  Sequential Compression Device (SCD) or Pneumo Boots:  You may need to wear SCD S on your legs or feet. These are wraps connected to a machine that pumps in air and releases it. The repeated pumping helps prevent blood clots from forming.           Follow-ups after your visit        Your next 10 appointments already scheduled     Mar 19, 2018  3:00 PM CDT   (Arrive by 2:45 PM)   PAC Anesthesia Consult with  Pac Anesthesiologist   [facility] ([facility] [place])    [address] [place] [address]  4th Floor  [facility] [address] [phone]            Mar 19, 2018  3:15 PM CDT   [facility] Lab Draw with  MAS[facility]IC LAB DRAW   [facility] Masonic Lab Draw ([facility])    [address] [place] [address]  [address]  [facility] [address]   [phone]            Mar 27, 2018  7:00 AM CDT   (Arrive by 6:45 AM)   MR PELVIS W/O & W CONTRAST with [id number]    [facility] MRI ([facility] [place])    [address] [facility]  1st Floor  [facility] [address] [phone]           Take your medicines as usual, unless your doctor tells you not to. Bring a list of your current medicines to your exam (including vitamins, minerals and over-the-counter drugs).  You may or may not receive intravenous (IV) contrast for this exam pending the discretion of the Radiologist.  You do not need to do anything special to prepare.  The MRI machine uses a strong magnet. Please wear clothes without metal (snaps, zippers). A sweatsuit works well, or we may give you a hospital gown.  Please remove any body piercings and hair extensions before you arrive. You will also remove watches, jewelry, hairpins, wallets, dentures, partial dental plates and hearing aids. You may wear contact lenses, and you may be able to wear your rings. We have a safe place to keep your personal items, but it is safer to leave them at home.  **IMPORTANT** THE INSTRUCTIONS BELOW ARE ONLY FOR THOSE PATIENTS WHO HAVE BEEN PRESCRIBED SEDATION OR GENERAL ANESTHESIA DURING THEIR MRI PROCEDURE:  IF YOUR DOCTOR PRESCRIBED ORAL SEDATION (take medicine to help you relax during your exam):   You must get the medicine from your doctor (oral medication) before you arrive. Bring the medicine to the exam. Do not take it at home. You ll be told when to take it upon arriving for your exam.   Arrive one hour early. Bring someone who can take you home after the test. Your medicine will make you sleepy. After the exam, you may not drive, take a bus or take a taxi by yourself.  IF YOUR DOCTOR PRESCRIBED IV SEDATION:   Arrive one hour early. Bring someone who can take you home after the test. Your medicine will make you sleepy. After the exam, you may not drive, take a bus or take a taxi by yourself.   No eating 6 hours before your exam. You may have clear liquids up until 4 hours before your exam. (Clear  liquids include water, clear tea, black coffee and fruit juice without pulp.)  IF YOUR DOCTOR PRESCRIBED ANESTHESIA (be asleep for your exam):   Arrive 1 1/2 hours early. Bring someone who can take you home after the test. You may not drive, take a bus or take a taxi by yourself.   No eating 8 hours before your exam. You may have clear liquids up until 4 hours before your exam. (Clear liquids include water, clear tea, black coffee and fruit juice without pulp.)   You will spend four to five hours in the recovery room.  Please call the Imaging Department at your exam site with any questions.              Future tests that were ordered for you today     Open Future Orders        Priority Expected Expires Ordered    ABO/Rh type and screen Routine 3/19/2018 2018 3/19/2018    CBC with platelets Routine 3/19/2018 2018 3/19/2018    Basic metabolic panel Routine 3/19/2018 2018 3/19/2018    MRI Pelvis w & w/o contrast Routine  3/19/2019 3/19/2018            Who to contact     Please call your clinic at [phone] to:    Ask questions about your health    Make or cancel appointments    Discuss your medicines    Learn about your test results    Speak to your doctor            Additional Information About Your Visit        shenzhoufuhart Information     [email or web address] is an electronic gateway that provides easy, online access to your medical records. With [email or web address], you can request a clinic appointment, read your test results, renew a prescription or communicate with your care team.     To sign up for [email or web address] visit the website at [email or web address]   You will be asked to enter the access code listed below, as well as some personal information. Please follow the directions to create your username and password.     Your access code is: [email or web address]  Expires: 2018 10:39 AM     Your access code will  in 90 days. If you need help or a new code, please contact your [facility] or  call [phone] for assistance.        Care EveryWhere ID     This is your Care EveryWhere ID. This could be used by other organizations to access your [facility] medical records  [id number]         Blood Pressure from Last 3 Encounters:   03/19/18 157/80   03/19/18 157/80   01/29/18 173/83    Weight from Last 3 Encounters:   03/19/18 87.4 kg (192 lb 9.6 oz)   03/19/18 87.4 kg (192 lb 10.9 oz)   01/29/18 88 kg (194 lb 1.6 oz)              Today, you had the following     No orders found for display       Primary Care Provider Office Phone # Fax #    Gin Kaye [phone]       [facility] [address] [facility] [address]        Equal Access to Services     LISET CATALAN : Hadii kika hurtado hadasho Soomaali, waaxda luqadaha, qaybta kaalmada adeegyada, david zheng . So [facility] [phone].    ATENCIÓN: Si habla español, tiene a wagoner disposición servicios gratuitos de asistencia lingüística. [facility] [phone].    We comply with applicable federal civil rights laws and [place] laws. We do not discriminate on the basis of race, color, national origin, age, disability, sex, sexual orientation, or gender identity.            Thank you!     Thank you for choosing [facility] PREOPERATIVE ASSESSMENT [place]  for your care. Our goal is always to provide you with excellent care. Hearing back from our patients is one way we can continue to improve our services. Please take a few minutes to complete the written survey that you may receive in the mail after your visit with us. Thank you!             Your Updated Medication List - Protect others around you: Learn how to safely use, store and throw away your medicines at www.disposemymeds.org.          This list is accurate as of 3/19/18  2:30 PM.  Always use your most recent med list.                   Brand Name Dispense Instructions for use Diagnosis    CALCIUM + D PO      Take by mouth 2 times daily        CARVEDILOL PO       Take by mouth 2 times daily (with meals)        COLACE PO      Take by mouth At Bedtime        HYDRALAZINE HCL PO      Take by mouth 2 times daily        LOSARTAN POTASSIUM PO      Take by mouth every morning        MAGNESIUM PO      Take by mouth 2 times daily        Multi-vitamin Tabs tablet      Take 1 tablet by mouth At Bedtime        ranitidine 150 MG capsule    ZANTAC     one capsule two times a day as needed        VESICARE PO      Take by mouth At Bedtime

## 2018-03-19 NOTE — PROGRESS NOTES
Follow Up Notes on Referred Patient    Date: 3/19/2018       Dr. Gin Kaye  [facility]  [address] [place] [address]       RE: Jennie Pritchett  : 1950  CAROL: 3/19/2018    Dear Dr. Gin Kaye:    Jennie Pritchett is a 68 year old woman with a diagnosis of high grade cervical dysplasia.             The patient presents today for followup.  She has been doing well since the last time I have seen her.  No new symptoms.         Past Medical History:    Past Medical History:   Diagnosis Date     GERD (gastroesophageal reflux disease)      History of small bowel obstruction     s/p resection     Hyperlipidemia      Hypertension      Obesity          Past Surgical History:    Past Surgical History:   Procedure Laterality Date     BUNIONECTOMY       CATARACT IOL, RT/LT        SECTION       COLONOSCOPY       CONIZATION       HERNIA REPAIR      ventral hernia     SMALL BOWEL RESECTION           Health Maintenance Due   Topic Date Due     TETANUS IMMUNIZATION (SYSTEM ASSIGNED)  1967     HEPATITIS C SCREENING  1967     LIPID SCREEN Q5 YR FEMALE (SYSTEM ASSIGNED)  1994     MAMMO SCREEN Q2 YR (SYSTEM ASSIGNED)  1999     COLON CANCER SCREEN (SYSTEM ASSIGNED)  1999     ADVANCE DIRECTIVE PLANNING Q5 YRS  2004     FALL RISK ASSESSMENT  2014     DEXA SCAN SCREENING (SYSTEM ASSIGNED)  2014     PNEUMOCOCCAL (1 of 2 - PCV13) 2014       Current Medications:     Current Outpatient Prescriptions   Medication Sig Dispense Refill     LOSARTAN POTASSIUM PO Take by mouth every morning       HYDRALAZINE HCL PO Take by mouth 2 times daily       Calcium Citrate-Vitamin D (CALCIUM + D PO) Take by mouth 2 times daily       MAGNESIUM PO Take by mouth 2 times daily       Solifenacin Succinate (VESICARE PO) Take by mouth At Bedtime       CARVEDILOL PO Take by mouth 2 times daily (with meals)       multivitamin, therapeutic with minerals  (MULTI-VITAMIN) TABS tablet Take 1 tablet by mouth At Bedtime       Docusate Sodium (COLACE PO) Take by mouth At Bedtime       ranitidine (ZANTAC) 150 MG capsule one capsule two times a day as needed           Allergies:        Allergies   Allergen Reactions     Lisinopril Cough     Metoprolol Other (See Comments)     Causes lowered heart rate         Social History:     Social History   Substance Use Topics     Smoking status: Former Smoker     Packs/day: 0.25     Years: 40.00     Quit date: 3/19/2000     Smokeless tobacco: Never Used     Alcohol use Yes      Comment: monthy       History   Drug Use No           Physical Exam:     /80  Pulse 69  Temp 98.2  F (36.8  C) (Oral)  Resp 18  Wt 87.4 kg (192 lb 10.9 oz)  SpO2 98%  BMI 37.01 kg/m2  Body mass index is 37.01 kg/(m^2).    General Appearance: healthy and alert, no distress     ABDOMEN:  Soft, nontender, nondistended.  Well-healed midline incision.   PELVIC:  Normal external genitalia.  The cervix has been removed with a cone biopsy.  No abnormalities on bimanual exam.  Confirmed rectovaginal.         Assessment:    Jennie Pritchett is a 68 year old woman with a diagnosis of high grade cervical dysplasia.     A total of 30 minutes was spent with the patient, 25 minutes of which were spent in counseling the patient and/or treatment planning.      1.  High-grade cervical dysplasia.   2.  Prior bowel perforation with bowel resection.   3.  Abdominal wall hernia repair.       Discussed with the patient, recommended to proceed with a robotic hysterectomy as well as salpingo-oophorectomy, possible pelvic and paraaortic lymphadenectomy.  There is a significant chance we will have to do this open, as well as possible bowel resection and even possible stoma.  We will obtain an MRI to be sure there are no structural abnormalities in the cervix, as we cannot do another conization given the absence of the cervix.  The patient agrees with the plan.  I will have her  see my colleagues in Anesthesia for preoperative clearance.  All questions were answered.       Risks, benefits and alternatives to proceed discussed in detail with the patient. Risks include but are not limited to bleeding, infection, possible injury to surrounding organs including bowel, bladder, ureter, need for second procedure/surgery related to complications from first procedure, postoperative medical complications such as cardiopulmonary events, lymphedema, lymphocyst, thromboembolic events.  Consent for surgery, blood transfusion signed.  Will arrange appropriate preoperative blood work, CXR, EKG. Patient also advised on need for postoperative surveillance and/or adjuvant therapy. Questions answered.    Bird Isbell MD, MS    Department of Obstetrics and Gynecology   Division of Gynecologic Oncology   [facility]  Phone: [phone]        CC  Patient Care Team:  Gin Zamarripa as PCP - General (Family Practice)  Raza Gamino MD as Referring Physician (OB/Gyn)  GIN ZAMARRIPA

## 2018-03-19 NOTE — MR AVS SNAPSHOT
After Visit Summary   3/19/2018    Jennie Pritchett    MRN: [id number]           Patient Information     Date Of Birth          1950        Visit Information        Provider Department      3/19/2018 11:20 AM Bird Isbell MD [facility]        Today's Diagnoses     Cervical dysplasia    -  1       Follow-ups after your visit        Your next 10 appointments already scheduled     Mar 27, 2018  7:00 AM CDT   (Arrive by 6:45 AM)   MR PELVIS W/O & W CONTRAST with [id number]   [facility] ([facility] [place])    [address] [place] [address] [phone]           Take your medicines as usual, unless your doctor tells you not to. Bring a list of your current medicines to your exam (including vitamins, minerals and over-the-counter drugs).  You may or may not receive intravenous (IV) contrast for this exam pending the discretion of the Radiologist.  You do not need to do anything special to prepare.  The MRI machine uses a strong magnet. Please wear clothes without metal (snaps, zippers). A sweatsuit works well, or we may give you a hospital gown.  Please remove any body piercings and hair extensions before you arrive. You will also remove watches, jewelry, hairpins, wallets, dentures, partial dental plates and hearing aids. You may wear contact lenses, and you may be able to wear your rings. We have a safe place to keep your personal items, but it is safer to leave them at home.  **IMPORTANT** THE INSTRUCTIONS BELOW ARE ONLY FOR THOSE PATIENTS WHO HAVE BEEN PRESCRIBED SEDATION OR GENERAL ANESTHESIA DURING THEIR MRI PROCEDURE:  IF YOUR DOCTOR PRESCRIBED ORAL SEDATION (take medicine to help you relax during your exam):   You must get the medicine from your doctor (oral medication) before you arrive. Bring the medicine to the exam. Do not take it at home. You ll be told when to take it upon arriving for your exam.    Arrive one hour early. Bring someone who can take you home after the test. Your medicine will make you sleepy. After the exam, you may not drive, take a bus or take a taxi by yourself.  IF YOUR DOCTOR PRESCRIBED IV SEDATION:   Arrive one hour early. Bring someone who can take you home after the test. Your medicine will make you sleepy. After the exam, you may not drive, take a bus or take a taxi by yourself.   No eating 6 hours before your exam. You may have clear liquids up until 4 hours before your exam. (Clear liquids include water, clear tea, black coffee and fruit juice without pulp.)  IF YOUR DOCTOR PRESCRIBED ANESTHESIA (be asleep for your exam):   Arrive 1 1/2 hours early. Bring someone who can take you home after the test. You may not drive, take a bus or take a taxi by yourself.   No eating 8 hours before your exam. You may have clear liquids up until 4 hours before your exam. (Clear liquids include water, clear tea, black coffee and fruit juice without pulp.)   You will spend four to five hours in the recovery room.  Please call the Imaging Department at your exam site with any questions.            Apr 04, 2018   Procedure with Bird Isbell MD   [facility], [place], Same Day Surgery (--)    [address] [facility] [address] [phone]              Future tests that were ordered for you today     Open Future Orders        Priority Expected Expires Ordered    MRI Pelvis w & w/o contrast Routine  3/19/2019 3/19/2018            Who to contact     If you have questions or need follow up information about today's clinic visit or your schedule please contact [facility] directly at [phone].  Normal or non-critical lab and imaging results will be communicated to you by MyChart, letter or phone within 4 business days after the clinic has received the results. If you do not hear from us within 7 days, please contact the clinic through MyChart or phone. If you have a critical or  "abnormal lab result, we will notify you by phone as soon as possible.  Submit refill requests through [employer] or call your pharmacy and they will forward the refill request to us. Please allow 3 business days for your refill to be completed.          Additional Information About Your Visit        TagArrayhart Information     T[employer]ethera lets you send messages to your doctor, view your test results, renew your prescriptions, schedule appointments and more. To sign up, go to [email or web address] . Click on \"Log in\" on the left side of the screen, which will take you to the Welcome page. Then click on \"Sign up Now\" on the right side of the page.     You will be asked to enter the access code listed below, as well as some personal information. Please follow the directions to create your username and password.     Your access code is: [email or web address]  Expires: 2018 10:39 AM     Your access code will  in 90 days. If you need help or a new code, please call your [facility] clinic or [phone].        Care EveryWhere ID     This is your Care EveryWhere ID. This could be used by other organizations to access your [facility] medical records  [id number]        Your Vitals Were     Pulse Temperature Respirations Pulse Oximetry BMI (Body Mass Index)       69 98.2  F (36.8  C) (Oral) 18 98% 37.01 kg/m2        Blood Pressure from Last 3 Encounters:   18 157/80   18 157/80   18 173/83    Weight from Last 3 Encounters:   18 87.4 kg (192 lb 9.6 oz)   18 87.4 kg (192 lb 10.9 oz)   18 88 kg (194 lb 1.6 oz)              We Performed the Following     [facility]-Operative Worksheet - DaVinci Total Laparoscopic Hysterectomy, bilateral, Salpingo-Oophorectomy, cystoscopy, possible open, possible lymph node dissection        Primary Care Provider Office Phone # Fax #    Gin Kaye [phone]       [facility] [address]  [facility] [address]        Equal Access to Services     " LISET CATALAN : Hadii aad bryant hailey Red, waaxda luqadaha, qaybta kaalmada johnnyashwinisolange, waxnato sobia haycindy huangrichardartie zheng . So [facility] [phone].    ATENCIÓN: Si habla español, tiene a wagoner disposición servicios gratuitos de asistencia lingüística. Llame al [phone].    We comply with applicable federal civil rights laws and [place] laws. We do not discriminate on the basis of race, color, national origin, age, disability, sex, sexual orientation, or gender identity.            Thank you!     Thank you for choosing [facility]  for your care. Our goal is always to provide you with excellent care. Hearing back from our patients is one way we can continue to improve our services. Please take a few minutes to complete the written survey that you may receive in the mail after your visit with us. Thank you!             Your Updated Medication List - Protect others around you: Learn how to safely use, store and throw away your medicines at www.disposemymeds.org.          This list is accurate as of 3/19/18 11:59 PM.  Always use your most recent med list.                   Brand Name Dispense Instructions for use Diagnosis    CALCIUM + D PO      Take by mouth 2 times daily        CARVEDILOL PO      Take by mouth 2 times daily (with meals)        COLACE PO      Take by mouth At Bedtime        HYDRALAZINE HCL PO      Take by mouth 2 times daily        LOSARTAN POTASSIUM PO      Take by mouth every morning        MAGNESIUM PO      Take by mouth 2 times daily        Multi-vitamin Tabs tablet      Take 1 tablet by mouth At Bedtime        ranitidine 150 MG capsule    ZANTAC     one capsule two times a day as needed        VESICARE PO      Take by mouth At Bedtime

## 2018-03-19 NOTE — ANESTHESIA PREPROCEDURE EVALUATION
Anesthesia Evaluation     . Pt has had prior anesthetic. Type: General and MAC    No history of anesthetic complications          ROS/MED HX    ENT/Pulmonary:     (+)CARLTON risk factors hypertension, obese, , . .    Neurologic:  - neg neurologic ROS     Cardiovascular: Comment: Patient says she had a normal exercise stress test in [facility]  Records requested    (+) hypertension----. : . . . :. . Previous cardiac testing       METS/Exercise Tolerance:  >4 METS   Hematologic:     (+) History of Transfusion no previous transfusion reaction -      Musculoskeletal:   (+) arthritis, , , -       GI/Hepatic:     (+) GERD Asymptomatic on medication,       Renal/Genitourinary:  - ROS Renal section negative       Endo:     (+) Obesity, .      Psychiatric:  - neg psychiatric ROS       Infectious Disease:  - neg infectious disease ROS       Malignancy:   (+) Malignancy History of Other  Other CA cervix carcinoma Active status post         Other:    (+) no H/O Chronic Pain,                   Physical Exam  Normal systems: cardiovascular and pulmonary    Airway   Mallampati: I  TM distance: >3 FB  Neck ROM: full    Dental   (+) upper dentures and lower dentures    Cardiovascular   Rhythm and rate: regular and normal      Pulmonary    breath sounds clear to auscultation               PAC Discussion and Assessment    ASA Classification: 3  Case is suitable for: [place]  Anesthetic techniques and relevant risks discussed:   Invasive monitoring and risk discussed:   Types:   Possibility and Risk of blood transfusion discussed:   NPO instructions given:   Additional anesthetic preparation and risks discussed:   Needs early admission to pre-op area:   Other:     PAC Resident/NP Anesthesia Assessment:  Jennie Pritchett is a 68 year old female who presents for pre-operative H & P in preparation for a Total Hysterectomy on Date TBD with Dr. Isbell for cervical carcinoma at the [facility].    PAC referral for risk assessment and optimization for anesthesia with comorbid conditions of:    Pre-operative considerations:  1.  Cardiac:  Functional status METS >4   Risk of Major Adverse Cardiac event: 0.9%  -HTN, no known cardiac disease, patient reports having a normal exercise stress test in 2017 from her home clinic of [facility] in [place],  records requested  2.  Pulm:   CARLTON risk:  low  -Former smoker, 10 pack years, quit 2000  3.  GI:  Risk of PONV score =3 .  If > 2, anti-emetic intervention recommended.  -History of small bowel obstruction s/p small bowel resection in 2014    Patient is optimized and is an acceptable candidate for the proposed procedure.  No further diagnostic evaluation is needed.    Gill MACKEY-SUSANNE  03/19/18 2:37 PM          Mid-Level Provider/Resident:   Date:   Time:     Attending Anesthesiologist Anesthesia Assessment:        Anesthesiologist:   Date:   Time:   Pass/Fail:   Disposition:     PAC Pharmacist Assessment:        Pharmacist:   Date:   Time:                           .

## 2018-03-19 NOTE — NURSING NOTE
"Oncology Rooming Note    March 19, 2018 11:15 AM   Jennie Pritchett is a 68 year old female who presents for:    Chief Complaint   Patient presents with     Oncology Clinic Visit     Return for Cervix Ca , Surgery Concent      Initial Vitals: /80  Pulse 69  Temp 98.2  F (36.8  C) (Oral)  Resp 18  Wt 87.4 kg (192 lb 10.9 oz)  SpO2 98%  BMI 37.01 kg/m2 Estimated body mass index is 37.01 kg/(m^2) as calculated from the following:    Height as of 1/29/18: 1.537 m (5' 0.5\").    Weight as of this encounter: 87.4 kg (192 lb 10.9 oz). Body surface area is 1.93 meters squared.  No Pain (0) Comment: Data Unavailable   No LMP recorded. Patient is postmenopausal.  Allergies reviewed: Yes  Medications reviewed: Yes    Medications: Medication refills not needed today.  Pharmacy name entered into EPIC: [facility] [address] [place] [address]    Clinical concerns: results  Sukhi was notified.    9 minutes for nursing intake (face to face time)     Destiny Morgan MA              "

## 2018-03-23 ENCOUNTER — DOCUMENTATION ONLY (OUTPATIENT)
Dept: ONCOLOGY | Facility: CLINIC | Age: 68
End: 2018-03-23

## 2018-03-23 NOTE — PROGRESS NOTES
Nurse informed patient of surgery scheduled on 4/4/18 at 1:10pm at [facility].

## 2018-03-27 ENCOUNTER — RADIANT APPOINTMENT (OUTPATIENT)
Dept: MRI IMAGING | Facility: CLINIC | Age: 68
End: 2018-03-27
Attending: OBSTETRICS & GYNECOLOGY
Payer: COMMERCIAL

## 2018-03-27 DIAGNOSIS — N87.9 CERVICAL DYSPLASIA: ICD-10-CM

## 2018-03-27 RX ORDER — GADOBUTROL 604.72 MG/ML
10 INJECTION INTRAVENOUS ONCE
Status: COMPLETED | OUTPATIENT
Start: 2018-03-27 | End: 2018-03-27

## 2018-03-27 RX ADMIN — GADOBUTROL 10 ML: 604.72 INJECTION INTRAVENOUS at 06:34

## 2018-03-27 NOTE — DISCHARGE INSTRUCTIONS
MRI Contrast Discharge Instructions    The IV contrast you received today will pass out of your body in your  urine. This will happen in the next 24 hours. You will not feel this process.  Your urine will not change color.    Drink at least 4 extra glasses of water or juice today (unless your doctor  has restricted your fluids). This reduces the stress on your kidneys.  You may take your regular medicines.    If you are on dialysis: It is best to have dialysis today.    If you have a reaction: Most reactions happen right away. If you have  any new symptoms after leaving the hospital (such as hives or swelling),  call your hospital at the correct number below. Or call your family doctor.  If you have breathing distress or wheezing, call 911.    Special instructions: ***    I have read and understand the above information.    Signature:______________________________________ Date:___________    Staff:__________________________________________ Date:___________     Time:__________    [facility] Departments:    ___[place]: [phone]  ___[facility]: [phone]  ___[place]: [phone] ___[facility]: [phone]  ___[facility]: [phone]  ___[facility]: [phone]  ___[phone]  ___[facility]: [phone]  ___[phone]

## 2018-04-04 ENCOUNTER — ANESTHESIA (OUTPATIENT)
Dept: SURGERY | Facility: CLINIC | Age: 68
End: 2018-04-04
Payer: COMMERCIAL

## 2018-04-04 ENCOUNTER — ANESTHESIA EVENT (OUTPATIENT)
Dept: SURGERY | Facility: CLINIC | Age: 68
End: 2018-04-04
Payer: COMMERCIAL

## 2018-04-04 ENCOUNTER — SURGERY (OUTPATIENT)
Age: 68
End: 2018-04-04
Payer: COMMERCIAL

## 2018-04-04 ENCOUNTER — HOSPITAL ENCOUNTER (OUTPATIENT)
Facility: CLINIC | Age: 68
Discharge: HOME | End: 2018-04-04
Attending: OBSTETRICS & GYNECOLOGY | Admitting: OBSTETRICS & GYNECOLOGY
Payer: COMMERCIAL

## 2018-04-04 VITALS
HEIGHT: 60 IN | BODY MASS INDEX: 37.57 KG/M2 | WEIGHT: 191.36 LBS | OXYGEN SATURATION: 98 % | TEMPERATURE: 98 F | DIASTOLIC BLOOD PRESSURE: 73 MMHG | SYSTOLIC BLOOD PRESSURE: 166 MMHG | RESPIRATION RATE: 16 BRPM | HEART RATE: 58 BPM

## 2018-04-04 DIAGNOSIS — Z90.710 S/P LAPAROSCOPIC HYSTERECTOMY: Primary | ICD-10-CM

## 2018-04-04 LAB
ABO + RH BLD: NORMAL
ABO + RH BLD: NORMAL
BLD GP AB SCN SERPL QL: NORMAL
BLOOD BANK CMNT PATIENT-IMP: NORMAL
BLOOD BANK CMNT PATIENT-IMP: NORMAL
GLUCOSE BLDC GLUCOMTR-MCNC: 99 MG/DL (ref 70–99)
SPECIMEN EXP DATE BLD: NORMAL

## 2018-04-04 PROCEDURE — 25000566 ZZH SEVOFLURANE, EA 15 MIN: Performed by: OBSTETRICS & GYNECOLOGY

## 2018-04-04 PROCEDURE — 25000128 H RX IP 250 OP 636: Performed by: ANESTHESIOLOGY

## 2018-04-04 PROCEDURE — 25000132 ZZH RX MED GY IP 250 OP 250 PS 637: Performed by: STUDENT IN AN ORGANIZED HEALTH CARE EDUCATION/TRAINING PROGRAM

## 2018-04-04 PROCEDURE — 40000170 ZZH STATISTIC PRE-PROCEDURE ASSESSMENT II: Performed by: OBSTETRICS & GYNECOLOGY

## 2018-04-04 PROCEDURE — C9399 UNCLASSIFIED DRUGS OR BIOLOG: HCPCS | Performed by: NURSE ANESTHETIST, CERTIFIED REGISTERED

## 2018-04-04 PROCEDURE — 40000014 ZZH STATISTIC ARTERIAL MONITORING DAILY

## 2018-04-04 PROCEDURE — 27210794 ZZH OR GENERAL SUPPLY STERILE: Performed by: OBSTETRICS & GYNECOLOGY

## 2018-04-04 PROCEDURE — 71000015 ZZH RECOVERY PHASE 1 LEVEL 2 EA ADDTL HR: Performed by: OBSTETRICS & GYNECOLOGY

## 2018-04-04 PROCEDURE — 36000088 ZZH SURGERY LEVEL 8 EA 15 ADDTL MIN - UMMC: Performed by: OBSTETRICS & GYNECOLOGY

## 2018-04-04 PROCEDURE — 25000125 ZZHC RX 250: Performed by: NURSE ANESTHETIST, CERTIFIED REGISTERED

## 2018-04-04 PROCEDURE — 58571 TLH W/T/O 250 G OR LESS: CPT | Mod: GC | Performed by: OBSTETRICS & GYNECOLOGY

## 2018-04-04 PROCEDURE — 25000128 H RX IP 250 OP 636: Performed by: OBSTETRICS & GYNECOLOGY

## 2018-04-04 PROCEDURE — 71000027 ZZH RECOVERY PHASE 2 EACH 15 MINS: Performed by: OBSTETRICS & GYNECOLOGY

## 2018-04-04 PROCEDURE — 37000009 ZZH ANESTHESIA TECHNICAL FEE, EACH ADDTL 15 MIN: Performed by: OBSTETRICS & GYNECOLOGY

## 2018-04-04 PROCEDURE — 82962 GLUCOSE BLOOD TEST: CPT

## 2018-04-04 PROCEDURE — 37000008 ZZH ANESTHESIA TECHNICAL FEE, 1ST 30 MIN: Performed by: OBSTETRICS & GYNECOLOGY

## 2018-04-04 PROCEDURE — 36000086 ZZH SURGERY LEVEL 8 1ST 30 MIN UMMC: Performed by: OBSTETRICS & GYNECOLOGY

## 2018-04-04 PROCEDURE — 25000128 H RX IP 250 OP 636: Performed by: NURSE ANESTHETIST, CERTIFIED REGISTERED

## 2018-04-04 PROCEDURE — 25000131 ZZH RX MED GY IP 250 OP 636 PS 637: Performed by: ANESTHESIOLOGY

## 2018-04-04 PROCEDURE — 71000014 ZZH RECOVERY PHASE 1 LEVEL 2 FIRST HR: Performed by: OBSTETRICS & GYNECOLOGY

## 2018-04-04 PROCEDURE — 88309 TISSUE EXAM BY PATHOLOGIST: CPT | Performed by: OBSTETRICS & GYNECOLOGY

## 2018-04-04 RX ORDER — FENTANYL CITRATE 50 UG/ML
INJECTION, SOLUTION INTRAMUSCULAR; INTRAVENOUS PRN
Status: DISCONTINUED | OUTPATIENT
Start: 2018-04-04 | End: 2018-04-04

## 2018-04-04 RX ORDER — NALOXONE HYDROCHLORIDE 0.4 MG/ML
.1-.4 INJECTION, SOLUTION INTRAMUSCULAR; INTRAVENOUS; SUBCUTANEOUS
Status: DISCONTINUED | OUTPATIENT
Start: 2018-04-04 | End: 2018-04-04 | Stop reason: HOSPADM

## 2018-04-04 RX ORDER — ONDANSETRON 2 MG/ML
4 INJECTION INTRAMUSCULAR; INTRAVENOUS EVERY 30 MIN PRN
Status: DISCONTINUED | OUTPATIENT
Start: 2018-04-04 | End: 2018-04-04 | Stop reason: HOSPADM

## 2018-04-04 RX ORDER — CEFAZOLIN SODIUM 2 G/100ML
2 INJECTION, SOLUTION INTRAVENOUS
Status: COMPLETED | OUTPATIENT
Start: 2018-04-04 | End: 2018-04-04

## 2018-04-04 RX ORDER — ONDANSETRON 2 MG/ML
INJECTION INTRAMUSCULAR; INTRAVENOUS PRN
Status: DISCONTINUED | OUTPATIENT
Start: 2018-04-04 | End: 2018-04-04

## 2018-04-04 RX ORDER — OXYCODONE HYDROCHLORIDE 5 MG/1
5 TABLET ORAL
Status: DISCONTINUED | OUTPATIENT
Start: 2018-04-04 | End: 2018-04-04 | Stop reason: HOSPADM

## 2018-04-04 RX ORDER — CEFAZOLIN SODIUM 1 G/3ML
1 INJECTION, POWDER, FOR SOLUTION INTRAMUSCULAR; INTRAVENOUS SEE ADMIN INSTRUCTIONS
Status: DISCONTINUED | OUTPATIENT
Start: 2018-04-04 | End: 2018-04-04 | Stop reason: HOSPADM

## 2018-04-04 RX ORDER — SODIUM CHLORIDE, SODIUM LACTATE, POTASSIUM CHLORIDE, CALCIUM CHLORIDE 600; 310; 30; 20 MG/100ML; MG/100ML; MG/100ML; MG/100ML
INJECTION, SOLUTION INTRAVENOUS CONTINUOUS
Status: DISCONTINUED | OUTPATIENT
Start: 2018-04-04 | End: 2018-04-04 | Stop reason: HOSPADM

## 2018-04-04 RX ORDER — EPHEDRINE SULFATE 50 MG/ML
INJECTION, SOLUTION INTRAMUSCULAR; INTRAVENOUS; SUBCUTANEOUS PRN
Status: DISCONTINUED | OUTPATIENT
Start: 2018-04-04 | End: 2018-04-04

## 2018-04-04 RX ORDER — IBUPROFEN 600 MG/1
600 TABLET, FILM COATED ORAL EVERY 6 HOURS PRN
Qty: 30 TABLET | Refills: 0 | Status: SHIPPED | OUTPATIENT
Start: 2018-04-04

## 2018-04-04 RX ORDER — LABETALOL HYDROCHLORIDE 5 MG/ML
10 INJECTION, SOLUTION INTRAVENOUS
Status: COMPLETED | OUTPATIENT
Start: 2018-04-04 | End: 2018-04-04

## 2018-04-04 RX ORDER — ONDANSETRON 4 MG/1
4 TABLET, ORALLY DISINTEGRATING ORAL EVERY 30 MIN PRN
Status: DISCONTINUED | OUTPATIENT
Start: 2018-04-04 | End: 2018-04-04 | Stop reason: HOSPADM

## 2018-04-04 RX ORDER — FENTANYL CITRATE 50 UG/ML
25-50 INJECTION, SOLUTION INTRAMUSCULAR; INTRAVENOUS
Status: DISCONTINUED | OUTPATIENT
Start: 2018-04-04 | End: 2018-04-04 | Stop reason: HOSPADM

## 2018-04-04 RX ORDER — HYDROMORPHONE HYDROCHLORIDE 1 MG/ML
.3-.5 INJECTION, SOLUTION INTRAMUSCULAR; INTRAVENOUS; SUBCUTANEOUS EVERY 5 MIN PRN
Status: DISCONTINUED | OUTPATIENT
Start: 2018-04-04 | End: 2018-04-04 | Stop reason: HOSPADM

## 2018-04-04 RX ORDER — SODIUM CHLORIDE, SODIUM LACTATE, POTASSIUM CHLORIDE, CALCIUM CHLORIDE 600; 310; 30; 20 MG/100ML; MG/100ML; MG/100ML; MG/100ML
INJECTION, SOLUTION INTRAVENOUS CONTINUOUS PRN
Status: DISCONTINUED | OUTPATIENT
Start: 2018-04-04 | End: 2018-04-04

## 2018-04-04 RX ORDER — PROPOFOL 10 MG/ML
INJECTION, EMULSION INTRAVENOUS PRN
Status: DISCONTINUED | OUTPATIENT
Start: 2018-04-04 | End: 2018-04-04

## 2018-04-04 RX ORDER — IBUPROFEN 200 MG
600 TABLET ORAL
Status: COMPLETED | OUTPATIENT
Start: 2018-04-04 | End: 2018-04-04

## 2018-04-04 RX ORDER — OXYCODONE HYDROCHLORIDE 5 MG/1
5 TABLET ORAL EVERY 6 HOURS PRN
Qty: 20 TABLET | Refills: 0 | Status: SHIPPED | OUTPATIENT
Start: 2018-04-04

## 2018-04-04 RX ADMIN — FENTANYL CITRATE 25 MCG: 50 INJECTION INTRAMUSCULAR; INTRAVENOUS at 16:57

## 2018-04-04 RX ADMIN — IBUPROFEN 600 MG: 600 TABLET ORAL at 18:29

## 2018-04-04 RX ADMIN — ROCURONIUM BROMIDE 10 MG: 10 INJECTION INTRAVENOUS at 14:01

## 2018-04-04 RX ADMIN — PROPOFOL 120 MG: 10 INJECTION, EMULSION INTRAVENOUS at 13:28

## 2018-04-04 RX ADMIN — SUGAMMADEX 200 MG: 100 INJECTION, SOLUTION INTRAVENOUS at 15:40

## 2018-04-04 RX ADMIN — SODIUM CHLORIDE, POTASSIUM CHLORIDE, SODIUM LACTATE AND CALCIUM CHLORIDE: 600; 310; 30; 20 INJECTION, SOLUTION INTRAVENOUS at 13:13

## 2018-04-04 RX ADMIN — FENTANYL CITRATE 50 MCG: 50 INJECTION, SOLUTION INTRAMUSCULAR; INTRAVENOUS at 13:39

## 2018-04-04 RX ADMIN — FENTANYL CITRATE 50 MCG: 50 INJECTION, SOLUTION INTRAMUSCULAR; INTRAVENOUS at 15:43

## 2018-04-04 RX ADMIN — CEFAZOLIN SODIUM 2 G: 2 INJECTION, SOLUTION INTRAVENOUS at 13:44

## 2018-04-04 RX ADMIN — ONDANSETRON 4 MG: 2 INJECTION INTRAMUSCULAR; INTRAVENOUS at 15:36

## 2018-04-04 RX ADMIN — FENTANYL CITRATE 50 MCG: 50 INJECTION, SOLUTION INTRAMUSCULAR; INTRAVENOUS at 14:34

## 2018-04-04 RX ADMIN — Medication 5 MG: at 13:46

## 2018-04-04 RX ADMIN — ROCURONIUM BROMIDE 50 MG: 10 INJECTION INTRAVENOUS at 13:30

## 2018-04-04 RX ADMIN — FENTANYL CITRATE 100 MCG: 50 INJECTION, SOLUTION INTRAMUSCULAR; INTRAVENOUS at 14:08

## 2018-04-04 RX ADMIN — SODIUM CHLORIDE, POTASSIUM CHLORIDE, SODIUM LACTATE AND CALCIUM CHLORIDE: 600; 310; 30; 20 INJECTION, SOLUTION INTRAVENOUS at 13:35

## 2018-04-04 RX ADMIN — FENTANYL CITRATE 25 MCG: 50 INJECTION INTRAMUSCULAR; INTRAVENOUS at 16:30

## 2018-04-04 RX ADMIN — PROPOFOL 50 MG: 10 INJECTION, EMULSION INTRAVENOUS at 13:37

## 2018-04-04 RX ADMIN — ROCURONIUM BROMIDE 5 MG: 10 INJECTION INTRAVENOUS at 15:20

## 2018-04-04 RX ADMIN — Medication 10 MG: at 16:34

## 2018-04-04 NOTE — IP AVS SNAPSHOT
MRN:[id number]                      After Visit Summary   4/4/2018    Jennie Pritchett    MRN: [id number]           Thank you!     Thank you for choosing [facility] for your care. Our goal is always to provide you with excellent care. Hearing back from our patients is one way we can continue to improve our services. Please take a few minutes to complete the written survey that you may receive in the mail after you visit with us. Thank you!        Patient Information     Date Of Birth          1950        About your hospital stay     You were admitted on:  April 4, 2018 You last received care in the:  Same Day Surgery [facility]    You were discharged on:  April 4, 2018       Who to Call     For medical emergencies, please call 911.  For non-urgent questions about your medical care, please call your primary care provider or clinic, [phone]  For questions related to your surgery, please call your surgery clinic        Attending Provider     Provider Specialty    Bird Isbell MD Obstetrics & Gynecology, Maternal & Fetal Medicine       Primary Care Provider Office Phone # Fax #    Gin Kaye [phone]      After Care Instructions     Discharge Instructions       Pelvic Rest. No tampons, douching or intercourse for  6 weeks until after you see your doctor.            No lifting       No lifting over 15 pounds and no strenuous physical activity for 6 weeks                  Your next 10 appointments already scheduled     Apr 23, 2018  4:40 PM CDT   (Arrive by 4:25 PM)   Post-Op with Bird Isbell MD   [facility]    [address] [place] [address]  [address]  [facility] [address] [phone]              Further instructions from your care team       Follow-up appointment with Dr. Isbell 4/23/17 @ 4:40pm    GENERAL POST-OPERATIVE  PATIENT INSTRUCTIONS      FOLLOW-UP:    Call Surgeon if you  have:    Temperature greater than 100.4    Persistent nausea and vomiting    Severe uncontrolled pain    Redness, tenderness, or signs of infection (pain, swelling, redness, odor or green/yellow discharge around the site)    Difficulty breathing, headache or visual disturbances    Hives    Persistent dizziness or light-headedness    Extreme fatigue    Any other questions or concerns you may have after discharge    In an emergency, call 911 or go to an Emergency Department at a nearby hospital       WOUND CARE INSTRUCTIONS:  Keep a dry clean dressing on the wound if there is drainage. The initial bandage may be removed after 24 hours.  Once the wound has quit draining you may leave it open to air.  If clothing rubs against the wound or causes irritation and the wound is not draining you may cover it with a dry dressing during the daytime.  Try to keep the wound dry and avoid ointments on the wound unless directed to do so.  If the wound becomes bright red and painful or starts to drain infected material that is not clear, please contact your physician immediately.    1.  You may shower 48 hrs after surgery   2.  No soaking in the tub        DIET:  There are no dietary restrictions.  You may eat any foods that you can tolerate.  It is a good idea to eat a high fiber diet and take in plenty of fluids to prevent constipation.  If you do become constipated you may want to take a mild laxative or take ducolax tablets on a daily basis until your bowel habits are regular.  Constipation can be very uncomfortable, along with straining, after recent surgery.    ACTIVITY:  You are encouraged to cough and deep breath or use your incentive spirometer if you were given one, every 15-30 minutes when awake.  This will help prevent respiratory complications and low grade fevers post-operatively if you had a general anesthetic.  You may want to hug a pillow when coughing and sneezing to add additional support to the surgical area, if  you had abdominal or chest surgery, which will decrease pain during these times.       1.  No heavy lifting >20lbs or strenuous exercise for six-eight weeks.  No exercise in which you are using core muscles (yoga, pilates, swimming, weight lifting)   2.  You may walk as much as you wish.  You are encouraged to increase your   activity each day after surgery.  Stairs are okay.    3.  Nothing per vagina for eight weeks.  No tampons, no intercourse, no douching.  You can expect some light vaginal spotting and discharge for up to six weeks.  If bleeding becomes heavy, please contact the office.     MEDICATIONS:  Try to take narcotic medications and anti-inflammatory medications, such as tylenol, ibuprofen, naprosyn, etc., with food.  This will minimize stomach upset from the medication.  Should you develop nausea and vomiting from the pain medication, or develop a rash, please discontinue the medication and contact your physician.  You should not drive, make important decisions, or operate machinery when taking narcotic pain medication.    OTHER:  Patients are often constipated after general anesthesia and surgery.  The patient should continue to take stool softeners (for example, Senokot-S) for the next six weeks and consume adequate amounts of water.  If the patient remains constipated or unable to pass stool, please try one or all of the following measures:  1.  Milk of Magnesia 30cc twice a day as needed by mouth  2.  Metamucil 2 tablespoons in 12 ounces of fluid  3.  Dulcolax oral or suppositories  4.  Prunes or prune juice  5.  Miralax daily      QUESTIONS:  Please feel free to call your physician or the hospital  if you have any questions, and they will be glad to assist you.      Same-Day Surgery   Adult Discharge Orders & Instructions     For 24 hours after surgery:  1. Get plenty of rest.  A responsible adult must stay with you for at least 24 hours after you leave the hospital.   2. Pain medication  can slow your reflexes. Do not drive or use heavy equipment.  If you have weakness or tingling, don't drive or use heavy equipment until this feeling goes away.  3. Mixing alcohol and pain medication can cause dizziness and slow your breathing. It can even be fatal. Do not drink alcohol while taking pain medication.  4. Avoid strenuous or risky activities.  Ask for help when climbing stairs.   5. You may feel lightheaded.  If so, sit for a few minutes before standing.  Have someone help you get up.   6. If you have nausea (feel sick to your stomach), drink only clear liquids such as apple juice, ginger ale, broth or 7-Up.  Rest may also help.  Be sure to drink enough fluids.  Move to a regular diet as you feel able. Take pain medications with a small amount of solid food, such as toast or crackers, to avoid nausea.   7. A slight fever is normal. Call the doctor if your fever is over 100 F (37.7 C) (taken under the tongue) or lasts longer than 24 hours.  8. You may have a dry mouth, muscle aches, trouble sleeping or a sore throat.  These symptoms should go away after 24 hours.  9. Do not make important or legal decisions.   Pain Management:      1. Take pain medication (if prescribed) for pain as directed by your physician.        2. WARNING: If the pain medication you have been prescribed contains Tylenol  (acetaminophen), DO NOT take additional doses of Tylenol (acetaminophen).     Call your doctor for any of the followin.  Signs of infection (fever, growing tenderness at the surgery site, severe pain, a large amount of drainage or bleeding, foul-smelling drainage, redness, swelling).    2.  It has been over 8 to 10 hours since surgery and you are still not able to urinate (pee).    3.  Headache for over 24 hours.      To contact a doctor, call Dr. IsbellPyruvwixri_[phone]_ or:      [phone] and ask for the Resident On Call for:          ________gyn/ob__________________________________ (answered 24 hours a  "day)      Emergency Department:  [place] [place] [facility]: [phone]  [place] [facility]: [phone]               Rev. 10/2014        Tips for taking pain medications  To get the best pain relief possible , remember these points:      Take pain medications as directed, before pain becomes severe      Pain medication can upset your stomach: taking it with food may help      Constipation is a common side effect of pain medication. Drink plenty of  Fluids      Eat foods high in fiber. Take a stool softener  if recommended by your doctor or  Pharmacist.        Do not drink alcohol, drive or operate machinery while taking pain medications.    Ask about other ways to control pain, such as with heat, ice or relaxation.    Pending Results     Date and Time Order Name Status Description    2018 1453 Surgical pathology exam In process             Admission Information     Date & Time Provider Department Dept. Phone    2018 Bird Isbell MD Same Day Surgery [facility] [phone]      Your Vitals Were     Blood Pressure Pulse Temperature Respirations Height Weight    153/64 58 98.1  F (36.7  C) (Oral) 16 1.524 m (5') 86.8 kg (191 lb 5.8 oz)    Pulse Oximetry BMI (Body Mass Index)                100% 37.37 kg/m2          MyChart Information     [employer] lets you send messages to your doctor, view your test results, renew your prescriptions, schedule appointments and more. To sign up, go to [email or web address] . Click on \"Log in\" on the left side of the screen, which will take you to the Welcome page. Then click on \"Sign up Now\" on the right side of the page.     You will be asked to enter the access code listed below, as well as some personal information. Please follow the directions to create your username and password.     Your access code is: [email or web address]  Expires: 2018 10:39 AM     Your access code will  in 90 days. If you need help or a new code, please call your " [facility] or [phone].        Care EveryWhere ID     This is your Care EveryWhere ID. This could be used by other organizations to access your [facility] medical records  [id number]        Equal Access to Services     LISET CATALAN : Hadii aad ku hadbasiliolaney Red, waaxda luqadaha, qaybta kaalmada adeashwinida, david derekin hayaatata huangrichardartie burrows. So [facility] [phone].    ATENCIÓN: Si habla español, tiene a wagoner disposición servicios gratuitos de asistencia lingüística. Llame al [phone].    We comply with applicable federal civil rights laws and [place] laws. We do not discriminate on the basis of race, color, national origin, age, disability, sex, sexual orientation, or gender identity.               Review of your medicines      START taking        Dose / Directions    ibuprofen 600 MG tablet   Commonly known as:  ADVIL/MOTRIN   Used for:  S/P laparoscopic hysterectomy        Dose:  600 mg   Take 1 tablet (600 mg) by mouth every 6 hours as needed for pain (mild)   Quantity:  30 tablet   Refills:  0       oxyCODONE IR 5 MG tablet   Commonly known as:  ROXICODONE   Used for:  S/P laparoscopic hysterectomy        Dose:  5 mg   Take 1 tablet (5 mg) by mouth every 6 hours as needed for severe pain maximum 6 tablet(s) per day   Quantity:  20 tablet   Refills:  0         CONTINUE these medicines which have NOT CHANGED        Dose / Directions    CALCIUM + D PO        Take by mouth 2 times daily   Refills:  0       CARVEDILOL PO        Take by mouth 2 times daily (with meals)   Refills:  0       COLACE PO        Take by mouth At Bedtime   Refills:  0       HYDRALAZINE HCL PO        Take by mouth 2 times daily   Refills:  0       LOSARTAN POTASSIUM PO        Take by mouth every morning   Refills:  0       MAGNESIUM PO        Take by mouth 2 times daily   Refills:  0       Multi-vitamin Tabs tablet        Dose:  1 tablet   Take 1 tablet by mouth At Bedtime   Refills:  0       ranitidine 150 MG capsule    Commonly known as:  ZANTAC        one capsule two times a day as needed   Refills:  0       VESICARE PO        Take by mouth At Bedtime   Refills:  0            Where to get your medicines      These medications were sent to [facility] [place], [place] - [address]  [address], [facility] [address]     Phone:  [phone]     ibuprofen 600 MG tablet         Some of these will need a paper prescription and others can be bought over the counter. Ask your nurse if you have questions.     Bring a paper prescription for each of these medications     oxyCODONE IR 5 MG tablet                Protect others around you: Learn how to safely use, store and throw away your medicines at www.disposemymeds.org.        Information about OPIOIDS     PRESCRIPTION OPIOIDS: WHAT YOU NEED TO KNOW    Prescription opioids can be used to help relieve moderate to severe pain and are often prescribed following a surgery or injury, or for certain health conditions. These medications can be an important part of treatment but also come with serious risks. It is important to work with your health care provider to make sure you are getting the safest, most effective care.    WHAT ARE THE RISKS AND SIDE EFFECTS OF OPIOID USE?  Prescription opioids carry serious risks of addiction and overdose, especially with prolonged use. An opioid overdose, often marked by slowed breathing can cause sudden death. The use of prescription opioids can have a number of side effects as well, even when taken as directed:      Tolerance - meaning you might need to take more of a medication for the same pain relief    Physical dependence - meaning you have symptoms of withdrawal when a medication is stopped    Increased sensitivity to pain    Constipation    Nausea, vomiting, and dry mouth    Sleepiness and dizziness    Confusion    Depression    Low levels of testosterone that can result in lower sex drive, energy, and  strength    Itching and sweating    RISKS ARE GREATER WITH:    History of drug misuse, substance use disorder, or overdose    Mental health conditions (such as depression or anxiety)    Sleep apnea    Older age (65 years or older)    Pregnancy    Avoid alcohol while taking prescription opioids.   Also, unless specifically advised by your health care provider, medications to avoid include:    Benzodiazepines (such as Xanax or Valium)    Muscle relaxants (such as Soma or Flexeril)    Hypnotics (such as Ambien or Lunesta)    Other prescription opioids    KNOW YOUR OPTIONS:  Talk to your health care provider about ways to manage your pain that do not involve prescription opioids. Some of these options may actually work better and have fewer risks and side effects:    Pain relievers such as acetaminophen, ibuprofen, and naproxen    Some medications that are also used for depression or seizures    Physical therapy and exercise    Cognitive behavioral therapy, a psychological, goal-directed approach, in which patients learn how to modify physical, behavioral, and emotional triggers of pain and stress    IF YOU ARE PRESCRIBED OPIOIDS FOR PAIN:    Never take opioids in greater amounts or more often than prescribed    Follow up with your primary health care provider and work together to create a plan on how to manage your pain.    Talk about ways to help manage your pain that do not involve prescription opioids    Talk about all concerns and side effects    Help prevent misuse and abuse    Never sell or share prescription opioids    Never use another person's prescription opioids    Store prescription opioids in a secure place and out of reach of others (this may include visitors, children, friends, and family)    Visit www.cdc.gov/drugoverdose to learn about risks of opioid abuse and overdose    If you believe you may be struggling with addiction, tell your health care provider and ask for guidance or call [facility] National  Helpline at 3-632-895-HELP    LEARN MORE / www.cdc.gov/drugoverdose/prescribing/guideline.html    Safely dispose of unused prescription opioids: Find your local drug take-back programs and more information about the importance of safe disposal at www.doseofreality.[place].gov             Medication List: This is a list of all your medications and when to take them. Check marks below indicate your daily home schedule. Keep this list as a reference.      Medications           Morning Afternoon Evening Bedtime As Needed    CALCIUM + D PO   Take by mouth 2 times daily                                CARVEDILOL PO   Take by mouth 2 times daily (with meals)                                COLACE PO   Take by mouth At Bedtime                                HYDRALAZINE HCL PO   Take by mouth 2 times daily                                ibuprofen 600 MG tablet   Commonly known as:  ADVIL/MOTRIN   Take 1 tablet (600 mg) by mouth every 6 hours as needed for pain (mild)                                LOSARTAN POTASSIUM PO   Take by mouth every morning                                MAGNESIUM PO   Take by mouth 2 times daily                                Multi-vitamin Tabs tablet   Take 1 tablet by mouth At Bedtime                                oxyCODONE IR 5 MG tablet   Commonly known as:  ROXICODONE   Take 1 tablet (5 mg) by mouth every 6 hours as needed for severe pain maximum 6 tablet(s) per day                                ranitidine 150 MG capsule   Commonly known as:  ZANTAC   one capsule two times a day as needed                                VESICARE PO   Take by mouth At Bedtime

## 2018-04-04 NOTE — DISCHARGE INSTRUCTIONS
Follow-up appointment with Dr. Isbell 4/23/17 @ 4:40pm    GENERAL POST-OPERATIVE  PATIENT INSTRUCTIONS      FOLLOW-UP:    Call Surgeon if you have:    Temperature greater than 100.4    Persistent nausea and vomiting    Severe uncontrolled pain    Redness, tenderness, or signs of infection (pain, swelling, redness, odor or green/yellow discharge around the site)    Difficulty breathing, headache or visual disturbances    Hives    Persistent dizziness or light-headedness    Extreme fatigue    Any other questions or concerns you may have after discharge    In an emergency, call 911 or go to an Emergency Department at a nearby hospital       WOUND CARE INSTRUCTIONS:  Keep a dry clean dressing on the wound if there is drainage. The initial bandage may be removed after 24 hours.  Once the wound has quit draining you may leave it open to air.  If clothing rubs against the wound or causes irritation and the wound is not draining you may cover it with a dry dressing during the daytime.  Try to keep the wound dry and avoid ointments on the wound unless directed to do so.  If the wound becomes bright red and painful or starts to drain infected material that is not clear, please contact your physician immediately.    1.  You may shower 48 hrs after surgery   2.  No soaking in the tub        DIET:  There are no dietary restrictions.  You may eat any foods that you can tolerate.  It is a good idea to eat a high fiber diet and take in plenty of fluids to prevent constipation.  If you do become constipated you may want to take a mild laxative or take ducolax tablets on a daily basis until your bowel habits are regular.  Constipation can be very uncomfortable, along with straining, after recent surgery.    ACTIVITY:  You are encouraged to cough and deep breath or use your incentive spirometer if you were given one, every 15-30 minutes when awake.  This will help prevent respiratory complications and low grade fevers  post-operatively if you had a general anesthetic.  You may want to hug a pillow when coughing and sneezing to add additional support to the surgical area, if you had abdominal or chest surgery, which will decrease pain during these times.       1.  No heavy lifting >20lbs or strenuous exercise for six-eight weeks.  No exercise in which you are using core muscles (yoga, pilates, swimming, weight lifting)   2.  You may walk as much as you wish.  You are encouraged to increase your   activity each day after surgery.  Stairs are okay.    3.  Nothing per vagina for eight weeks.  No tampons, no intercourse, no douching.  You can expect some light vaginal spotting and discharge for up to six weeks.  If bleeding becomes heavy, please contact the office.     MEDICATIONS:  Try to take narcotic medications and anti-inflammatory medications, such as tylenol, ibuprofen, naprosyn, etc., with food.  This will minimize stomach upset from the medication.  Should you develop nausea and vomiting from the pain medication, or develop a rash, please discontinue the medication and contact your physician.  You should not drive, make important decisions, or operate machinery when taking narcotic pain medication.    OTHER:  Patients are often constipated after general anesthesia and surgery.  The patient should continue to take stool softeners (for example, Senokot-S) for the next six weeks and consume adequate amounts of water.  If the patient remains constipated or unable to pass stool, please try one or all of the following measures:  1.  Milk of Magnesia 30cc twice a day as needed by mouth  2.  Metamucil 2 tablespoons in 12 ounces of fluid  3.  Dulcolax oral or suppositories  4.  Prunes or prune juice  5.  Miralax daily      QUESTIONS:  Please feel free to call your physician or the hospital  if you have any questions, and they will be glad to assist you.      Same-Day Surgery   Adult Discharge Orders & Instructions     For 24  hours after surgery:  1. Get plenty of rest.  A responsible adult must stay with you for at least 24 hours after you leave the hospital.   2. Pain medication can slow your reflexes. Do not drive or use heavy equipment.  If you have weakness or tingling, don't drive or use heavy equipment until this feeling goes away.  3. Mixing alcohol and pain medication can cause dizziness and slow your breathing. It can even be fatal. Do not drink alcohol while taking pain medication.  4. Avoid strenuous or risky activities.  Ask for help when climbing stairs.   5. You may feel lightheaded.  If so, sit for a few minutes before standing.  Have someone help you get up.   6. If you have nausea (feel sick to your stomach), drink only clear liquids such as apple juice, ginger ale, broth or 7-Up.  Rest may also help.  Be sure to drink enough fluids.  Move to a regular diet as you feel able. Take pain medications with a small amount of solid food, such as toast or crackers, to avoid nausea.   7. A slight fever is normal. Call the doctor if your fever is over 100 F (37.7 C) (taken under the tongue) or lasts longer than 24 hours.  8. You may have a dry mouth, muscle aches, trouble sleeping or a sore throat.  These symptoms should go away after 24 hours.  9. Do not make important or legal decisions.   Pain Management:      1. Take pain medication (if prescribed) for pain as directed by your physician.        2. WARNING: If the pain medication you have been prescribed contains Tylenol  (acetaminophen), DO NOT take additional doses of Tylenol (acetaminophen).     Call your doctor for any of the followin.  Signs of infection (fever, growing tenderness at the surgery site, severe pain, a large amount of drainage or bleeding, foul-smelling drainage, redness, swelling).    2.  It has been over 8 to 10 hours since surgery and you are still not able to urinate (pee).    3.  Headache for over 24 hours.      To contact a doctor, call   [phone]_ or:      [phone] and ask for the Resident On Call for:          ________gyn/ob__________________________________ (answered 24 hours a day)      Emergency Department:  [facility]: [phone]  [facility]: [phone]               Rev. 10/2014        Tips for taking pain medications  To get the best pain relief possible , remember these points:      Take pain medications as directed, before pain becomes severe      Pain medication can upset your stomach: taking it with food may help      Constipation is a common side effect of pain medication. Drink plenty of  Fluids      Eat foods high in fiber. Take a stool softener  if recommended by your doctor or  Pharmacist.        Do not drink alcohol, drive or operate machinery while taking pain medications.    Ask about other ways to control pain, such as with heat, ice or relaxation.

## 2018-04-04 NOTE — IP AVS SNAPSHOT
Same Day Surgery [address] [address]    Phone:  [phone]                                       After Visit Summary   4/4/2018    Jennie Pritchett    MRN: [id number]           After Visit Summary Signature Page     I have received my discharge instructions, and my questions have been answered. I have discussed any challenges I see with this plan with the nurse or doctor.    ..........................................................................................................................................  Patient/Patient Representative Signature      ..........................................................................................................................................  Patient Representative Print Name and Relationship to Patient    ..................................................               ................................................  Date                                            Time    ..........................................................................................................................................  Reviewed by Signature/Title    ...................................................              ..............................................  Date                                                            Time

## 2018-04-04 NOTE — OR NURSING
Blood pressure is running elevated.  Dr Moreno called and has ordered Labetalol.  Refer to MAR.  Abdominal pain, dull, cramp like but patient states tolerable.  Minimal vaginal bleeding to pad.

## 2018-04-04 NOTE — ANESTHESIA PROCEDURE NOTES
Arterial Line Procedure Note  Staff:     Anesthesiologist:  CHINMAY PRECIADO  Location: In OR After Induction  Procedure Start/Stop Times:     patient identified, IV checked, risks and benefits discussed, informed consent, monitors and equipment checked, pre-op evaluation and at physician/surgeon's request      Correct Patient: Yes      Correct Position: Yes      Correct Site: Yes      Correct Procedure: Yes      Correct Laterality:  Yes    Site Marked:  N/A  Line Placement:     Procedure:  Arterial Line    Insertion Site:  Radial    Insertion laterality:  Left    Skin Prep: Chloraprep      Patient Prep: patient draped, mask, sterile gloves, hat and hand hygiene      Local skin infiltration:  None    Ultrasound Guided?: No      Catheter size:  20 gauge, 12 cm    Cath secured with: other (comment)      Dressing:  Tegaderm    Complications:  None obvious    Arterial waveform: Yes      IBP within 10% of NIBP: Yes

## 2018-04-04 NOTE — PROGRESS NOTES
GYN Oncology Postoperative Progress Note     S: Patient is feeling very well.  Pain is well controlled.  Tolerating PO intake without nausea or vomiting.  Voiding spontaneously without issues.  ambulating without dizziness.  Denies chest pain, chest pressure, or shortness of breath. Patient denies any vaginal bleeding, but did have a little bit of pink discharge when she wiped after going to the bathroom.   O:  Vitals:    04/04/18 1720 04/04/18 1730 04/04/18 1740 04/04/18 1805   BP: 157/68 162/60  153/64   Pulse:    58   Resp: 16 16 16 16   Temp:  97.8  F (36.6  C)  98.1  F (36.7  C)   TempSrc:  Oral  Oral   SpO2: 99% 99% 100% 100%   Weight:       Height:        On room air.   Exam:  Gen: comfortable, no acute distress  CV: RRR, no murmurs  Lungs: CTAB, no increased work of breathing  Abd: soft, appropriately  tender.  Davinci laparoscopic incisions with bandages in place, minimal shadowing.  Ext: warm and well perfused, trace edema.    A/P:Jennie Pritchett is a 68 year old female POD #0 s/p robotic-assisted TLH/BSO and cystoscopy, currently doing well and meeting goals of discharge.     FEN: s/p perioperative IVF, tolerating PO postoperatively  HTN: the patient has a history of essential hypertension, she was hypertensive in the OR, now mild range BP.  Recommend that she restarts her home losartan, carvedilol, and hydralazine at home (discussed with Dr. Pickard, fellow).  Resp: no difficulty with extubating, normal O2 sats postop.  Pain: doing well.  Will discharge home with ibuprofen and oxycodone.  : Ott catheter removed at the end of the case.  Voiding spontaneously without issues.   Heme: Hgb 12.2 > EBL 50   ID: Afebrile, received perioperative antibiotics    Dispo: meeting goals for discharge to home now and patient feels comfortable with the plan.     Shira Hawk MD  PGY-3 OBGYN  Gynecologic Oncology service pager # [phone]  4/4/2018 7:27 PM

## 2018-04-04 NOTE — ANESTHESIA POSTPROCEDURE EVALUATION
Patient: Jennie Pritchett    Procedure(s):  DaVinci Assisted Total Laparoscopic Hysterectomy, Bilateral Salpingo-Oophorectomy, Cystoscopy - Wound Class: II-Clean Contaminated    Diagnosis:Cervical Dysplasia   Diagnosis Additional Information: No value filed.    Anesthesia Type:  General, ETT    Note:  Anesthesia Post Evaluation    Patient location during evaluation: PACU  Patient participation: Able to fully participate in evaluation  Level of consciousness: awake  Pain management: adequate  Airway patency: patent  Cardiovascular status: acceptable  Respiratory status: acceptable  Hydration status: acceptable  PONV: none     Anesthetic complications: None          Last vitals:  Vitals:    04/04/18 1610 04/04/18 1620 04/04/18 1630   BP: 172/67 158/70 160/70   Pulse:      Resp: 14 14 14   Temp:      SpO2: 100% 100% 100%         Electronically Signed By: Carola Moreno MD  April 4, 2018  5:13 PM

## 2018-04-04 NOTE — BRIEF OP NOTE
[facility], [place]    Brief Operative Note    Pre-operative diagnosis: Cervical Dysplasia   Post-operative diagnosis Cervical Dysplasia  Procedure: Robotic assisted total laparoscopic hysterectomy and bilateral salpingo-oophorectomy, lysis of adhesions, cystoscopy  Surgeon: Surgeon(s) and Role:  Panel 1:     * Bird Isbell MD - Primary     * Sánchez Pickard MD - Resident - Assisting     * Marquita Verde MD - Resident - Assisting    Anesthesia: General   Estimated blood loss: 50mL  IVF: 1000mL  UOP: 275mL  Drains: None  Specimens:   ID Type Source Tests Collected by Time Destination   A : Uterus, Cervix, Bilateral fallopian tubes and ovaries Tissue Uterus, Cervix and Bilateral Fallopian Tubes SURGICAL PATHOLOGY EXAM Bird Isbell MD 4/4/2018  2:52 PM      Findings: Normal appearing uterus, fallopian tubes, and ovaries.  Normal abdominal survey.  No evident of injury from abdominal entry.  Ureters identified bilaterally.  Scant filmy pelvic sidewall adhesions. Omental adhesions to anterior abdominal wall x2.  Complications: None.  Implants: None.    Marquita Verde MD  PGY2 OBGYN  3:41 PM        Bird Isbell MD, MS    Department of Obstetrics and Gynecology   Division of Gynecologic Oncology   [facility]  Phone: [phone]

## 2018-04-04 NOTE — ANESTHESIA PREPROCEDURE EVALUATION
Anesthesia Evaluation     . Pt has had prior anesthetic. Type: General and MAC    No history of anesthetic complications          ROS/MED HX    ENT/Pulmonary:     (+)CARLTON risk factors hypertension, obese, , . .    Neurologic:  - neg neurologic ROS     Cardiovascular: Comment: Patient says she had a normal exercise stress test in 2017 done through her PCP [facility].  Records requested    (+) hypertension----. : . . . :. . Previous cardiac testing       METS/Exercise Tolerance:  >4 METS   Hematologic:     (+) History of Transfusion no previous transfusion reaction -      Musculoskeletal:   (+) arthritis, , , -       GI/Hepatic:     (+) GERD Asymptomatic on medication,       Renal/Genitourinary:  - ROS Renal section negative       Endo:     (+) Obesity, .      Psychiatric:  - neg psychiatric ROS       Infectious Disease:  - neg infectious disease ROS       Malignancy:   (+) Malignancy History of Other  Other CA cervix carcinoma Active status post         Other:    (+) no H/O Chronic Pain,                                  Anesthesia Plan      History & Physical Review  History and physical reviewed and following examination; no interval change.    ASA Status:  3 .    NPO Status:  > 8 hours    Plan for General and ETT with Intravenous and Propofol induction. Maintenance will be Balanced.    PONV prophylaxis:  Ondansetron (or other 5HT-3) and Dexamethasone or Solumedrol  Additional equipment: 2nd IV      Postoperative Care  Postoperative pain management:  IV analgesics and Multi-modal analgesia.      Consents  Anesthetic plan, risks, benefits and alternatives discussed with:  Patient.  Use of blood products discussed: Yes.   Use of blood products discussed with Patient.  Consented to blood products.  .                          .

## 2018-04-04 NOTE — OP NOTE
Gynecologic Oncology Operative Report    Patient: Jennie Pritchett  MRN: [id number]  DATE OF SURGERY: 4/4/2018    PREOPERATIVE DIAGNOSES:   1. Cervical dysplasia - RUDOLPH II-III  2. History of cervical conization    POSTOPERATIVE DIAGNOSES:   1. Cervical dysplasia - RUDOLPH II-III  2. History of cervical conization    PROCEDURES:   1. Robotic-assisted total laparoscopic hysterectomy, bilateral salpingo-oophorectomy  2. Cystoscopy    SURGEON: Bird Isbell MD     ASSISTANTS: Sánchez Pickard MD, fellow; Marquita Verde MD, PGY2    ANESTHESIA: General endotracheal.     ESTIMATED BLOOD LOSS: 50 ml    IV FLUIDS: 1000 ml crystalloid    DRAINS: Ott to dependent drainage productive of 275 ml clear light urine at the end of the case. Catheter was removed prior to transfer to PACU.    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 She was counseled regarding management options and consented to undergo the procedures listed above.     FINDINGS:   Severe cervical stenosis, minimal identifiable cervix.  Normal appearing uterus, fallopian tubes, and ovaries.  Normal abdominal survey.  No evident of injury from abdominal entry.  Ureters identified bilaterally.  Scant filmy pelvic sidewall adhesions. Omental adhesions to anterior abdominal wall x2.  Grossly unremarkable bladder mucosa with brisk bilateral ureteral jets on cystoscopy.     SPECIMENS: Uterus, cervix, bilateral ovaries and fallopian tubes    COMPLICATIONS: None.     CONDITION: Stable to PACU.     PROCEDURE: Following informed consent, the patient was taken to the operating room, where she underwent general anesthesia without difficulty. She was placed in dorsal lithotomy position using the Nik stirrups and was prepped and draped in normal sterile fashion.    The cervix was very stenotic and thus an EEA sizer was used for uterine manipulation. Attention was then turned to the laparoscopic  portion of the case. Montesinos's point was identified and a stab incision was made through which a Veress needle was introduced intraperitoneally. The abdomen was insufflated with carbon dioxide gas. Opening pressure was 6 mm Hg. The Veress was removed, and a vertical skin incision was made approximately 4 cm above the umbilicus to accommodate an 8 mm trochar, which was placed without difficulty. The laparoscope was introduced, and no evidence of trauma beneath the port site was found. Abdominal survey was performed with findings as described above. The bilateral hemidiaphragms and liver were examined and found to be free of gross abnormalities. The lower abdomen and pelvis were likewise inspected and appeared to be normal.    Two additional 8 mm ports were placed about 10cm infero-lateral from the camera port to accommodate an 8 mm AirSeal assistant port and another 8 mm robotic port, all placed under direct visualization.  Two additional 8 mm ports were placed laterally in the right and left lower quadrants,  The patient was placed in steep Trendelenburg. The small bowel was swept into the upper abdomen. The Da-Ashley was then docked and the robotic portion of the case was initiated.   An anterior abdominal wall adhesion was lysed to improve visualization of the operative field.  The right round ligament was cauterized and divided at the pelvic sidewall using the monopolar scissors. The retroperitoneal space was opened to above the level of the infundibulopelvic(IP)  ligament, and the paravesical and pararectal spaces were developed.  The ureter was visualized along the pelvic sidewall and was noted to be well below the IP. A window was made in the peritoneum below the IP ligament which was then sealed with the vessel sealer and divided. Dissection proceeded further caudally and medially through the broad ligament to reach the level of the uterine vessels. The bladder flap was taken down, and the uterine vessels were  skeletonized, sealed, and divided. Attention was turned to the patient's left side, where the same procedure was carried out after lysis of thin pelvic adhesions. The sentinel node on the left side also appeared to be at the level of the bifurcation of the iliac vessels. The bladder flap on the left side was joined to the right, and the uterosacral ligaments were cauterized and divided. The colopotomy was made anteriorly and carried out circumferentially over the EEA sizer in the vagina to help delineate the cervicovaginal junction. The uterus, cervix, and bilateral adnexa were removed via the vagina and sent to Pathology for frozen section. A vaginal occluder balloon was placed in the vagina and inflated to maintain pneumoperitoneum. The vaginal cuff was then closed using interrupted stitches of 0 Vicryl.  Frozen section of the uterus revealed no evidence of invasive disease.     The vaginal occluder balloon was removed intact from the vagina. The Ott catheter was then withdrawn and cystoscopy was performed. No evidence of injury or other abnormality was identified with regard to the bladder mucosa. Brisk efflux from the bilateral ureteral orifices was seen. The abdomen and pelvis were irrigated and verified to be free of any bleeding.  The robot was undocked. The four lateral ports were removed under visualization with the laparoscope.  The midline trocar was removed after a positive pressure breath and the abdomen was exsufflated. Skin at each port site was closed with subcuticular stitches of 4-0 Monocryl.     The patient tolerated the procedure well and was transferred to PACU in stable condition. All sponge, needle, and instrument counts were correct x 2.    Bird Isbell MD, MS    Department of Obstetrics and Gynecology   Division of Gynecologic Oncology   [facility]  Phone: [phone]

## 2018-04-12 LAB — COPATH REPORT: NORMAL

## 2018-04-24 ENCOUNTER — TELEPHONE (OUTPATIENT)
Dept: ONCOLOGY | Facility: CLINIC | Age: 68
End: 2018-04-24

## 2018-04-24 NOTE — TELEPHONE ENCOUNTER
Patient called RN and stated that she did not want to come to the [facility] for her post op appointment. She stated this is just to difficult and she would prefer to see Dr Gamino locally.     RN discussed this with MD who stated this would be ok if Dr. Gamino was comfortable with this.     RN discussed this with patient. However, patient stated that she had spoken with Dr. Gamino and he stated she had to come back to the [facility]. Patient then angrily stated that if she had to come to the [facility] she just wouldn't.     RN reached out to Dr Gamino's office to discuss.     Margi Parkinson RN

## 2022-06-19 NOTE — ANESTHESIA CARE TRANSFER NOTE
Patient: Jennie Pritchett    Procedure(s):  DaVinci Assisted Total Laparoscopic Hysterectomy, Bilateral Salpingo-Oophorectomy, Cystoscopy - Wound Class: II-Clean Contaminated    Diagnosis: Cervical Dysplasia   Diagnosis Additional Information: No value filed.    Anesthesia Type:   General, ETT     Note:  Airway :Face Mask  Patient transferred to:PACU  Comments: Pt to recovery room.  Spontaneous respirations.   Report given to RN.  Handoff Report: Identifed the Patient, Identified the Reponsible Provider, Reviewed the pertinent medical history, Discussed the surgical course, Reviewed Intra-OP anesthesia mangement and issues during anesthesia, Set expectations for post-procedure period and Allowed opportunity for questions and acknowledgement of understanding      Vitals: (Last set prior to Anesthesia Care Transfer)    CRNA VITALS  4/4/2018 1530 - 4/4/2018 1600      4/4/2018             Resp Rate (set): 10                Electronically Signed By: ERIBERTO Lowe CRNA  April 4, 2018  4:00 PM   Report from NAPOLEON Lubin.

## (undated) DEVICE — DAVINCI XI FCP BIPOLAR FENESTRATED 470205

## (undated) DEVICE — SU VICRYL 0 CT-2 27" J334H

## (undated) DEVICE — SU MONOCRYL 3-0 PS-1 27" Y936H

## (undated) DEVICE — DAVINCI HOT SHEARS TIP COVER  400180

## (undated) DEVICE — SUCTION MANIFOLD DORNOCH ULTRA CART UL-CL500

## (undated) DEVICE — PROTECTOR ARM ONE-STEP TRENDELENBURG 40418

## (undated) DEVICE — KOH COLPOTOMIZER OCCLUDER  CPO-6

## (undated) DEVICE — DRAPE SHEET REV FOLD 3/4 9349

## (undated) DEVICE — SU VICRYL 2-0 CT-2 27" J333H

## (undated) DEVICE — GLOVE PROTEXIS MICRO 7.5  2D73PM75

## (undated) DEVICE — ESU GROUND PAD ADULT REM W/15' CORD E7507DB

## (undated) DEVICE — DAVINCI XI DRAPE COLUMN 470341

## (undated) DEVICE — TUBING CONMED AIRSEAL SMOKE EVAC INSUFFLATION ASM-EVAC

## (undated) DEVICE — GLOVE PROTEXIS BLUE W/NEU-THERA 8.0  2D73EB80

## (undated) DEVICE — PREP TECHNI-CARE CHLOROXYLENOL 3% 4OZ BOTTLE C222-4ZWO

## (undated) DEVICE — DRSG PRIMAPORE 02X3" 7133

## (undated) DEVICE — DAVINCI XI NDL DRIVER MEGA SUTURE CUT 8MM 470309

## (undated) DEVICE — DAVINCI XI SEAL UNIVERSAL 5-8MM 470361

## (undated) DEVICE — KIT PATIENT POSITIONING PIGAZZI LATEX FREE 40580

## (undated) DEVICE — SOL NACL 0.9% INJ 1000ML BAG 07983-09

## (undated) DEVICE — SUCTION IRR STRYKERFLOW II W/TIP 250-070-520

## (undated) DEVICE — SOL ADH LIQUID BENZOIN SWAB 0.6ML C1544

## (undated) DEVICE — NDL INSUFFLATION 120MM VERRES 172015

## (undated) DEVICE — LINEN TOWEL PACK X6 WHITE 5487

## (undated) DEVICE — PACK GOWN 3/PK DISP XL SBA32GPFCB

## (undated) DEVICE — SOL WATER IRRIG 1000ML BOTTLE 2F7114

## (undated) DEVICE — ESU PENCIL W/COATED BLADE E2450H

## (undated) DEVICE — DEVICE SUTURE GRASPER TROCAR CLOSURE 14GA PMITCSG

## (undated) DEVICE — ENDO SHEARS 5MM 176643

## (undated) DEVICE — DAVINCI XI DRAPE ARM 470015

## (undated) DEVICE — SYSTEM CLEARIFY VISUALIZATION 21-345

## (undated) DEVICE — PREP CHLORAPREP 26ML TINTED ORANGE  260815

## (undated) DEVICE — SU VICRYL 0 TIE 54" J608H

## (undated) DEVICE — DRAPE MAYO STAND 23X54 8337

## (undated) DEVICE — DAVINCI XI VESSEL SEALER 480322

## (undated) DEVICE — Device

## (undated) DEVICE — LINEN TOWEL PACK X30 5481

## (undated) DEVICE — JELLY LUBRICATING SURGILUBE 2OZ TUBE

## (undated) DEVICE — ENDO OBTURATOR ACCESS PORT BLADELESS 8X100MM IAS8-100LP

## (undated) DEVICE — SPONGE LAP 18X18" X8435

## (undated) DEVICE — TUBING IRRIG CYSTO/BLADDER SET 81" LF 2C4040

## (undated) RX ORDER — IBUPROFEN 600 MG/1
TABLET, FILM COATED ORAL
Status: DISPENSED
Start: 2018-04-04

## (undated) RX ORDER — FENTANYL CITRATE 50 UG/ML
INJECTION, SOLUTION INTRAMUSCULAR; INTRAVENOUS
Status: DISPENSED
Start: 2018-04-04

## (undated) RX ORDER — PHENYLEPHRINE HCL IN 0.9% NACL 1 MG/10 ML
SYRINGE (ML) INTRAVENOUS
Status: DISPENSED
Start: 2018-04-04

## (undated) RX ORDER — LABETALOL HYDROCHLORIDE 5 MG/ML
INJECTION, SOLUTION INTRAVENOUS
Status: DISPENSED
Start: 2018-04-04

## (undated) RX ORDER — CEFAZOLIN SODIUM 1 G/3ML
INJECTION, POWDER, FOR SOLUTION INTRAMUSCULAR; INTRAVENOUS
Status: DISPENSED
Start: 2018-04-04

## (undated) RX ORDER — LIDOCAINE HYDROCHLORIDE 20 MG/ML
INJECTION, SOLUTION EPIDURAL; INFILTRATION; INTRACAUDAL; PERINEURAL
Status: DISPENSED
Start: 2018-04-04

## (undated) RX ORDER — EPHEDRINE SULFATE 50 MG/ML
INJECTION, SOLUTION INTRAMUSCULAR; INTRAVENOUS; SUBCUTANEOUS
Status: DISPENSED
Start: 2018-04-04

## (undated) RX ORDER — PROPOFOL 10 MG/ML
INJECTION, EMULSION INTRAVENOUS
Status: DISPENSED
Start: 2018-04-04

## (undated) RX ORDER — NEOSTIGMINE METHYLSULFATE 1 MG/ML
VIAL (ML) INJECTION
Status: DISPENSED
Start: 2018-04-04

## (undated) RX ORDER — ALBUMIN, HUMAN INJ 5% 5 %
SOLUTION INTRAVENOUS
Status: DISPENSED
Start: 2018-04-04

## (undated) RX ORDER — GLYCOPYRROLATE 0.2 MG/ML
INJECTION, SOLUTION INTRAMUSCULAR; INTRAVENOUS
Status: DISPENSED
Start: 2018-04-04